# Patient Record
Sex: FEMALE | Race: WHITE | URBAN - METROPOLITAN AREA
[De-identification: names, ages, dates, MRNs, and addresses within clinical notes are randomized per-mention and may not be internally consistent; named-entity substitution may affect disease eponyms.]

---

## 2020-11-19 LAB — PAP SMEAR: NORMAL

## 2021-07-19 ENCOUNTER — HOSPITAL ENCOUNTER (OUTPATIENT)
Dept: GENERAL RADIOLOGY | Age: 46
Discharge: HOME | End: 2021-07-19
Payer: MEDICARE

## 2021-07-19 ENCOUNTER — HOSPITAL ENCOUNTER (OUTPATIENT)
Age: 46
Discharge: HOME | End: 2021-07-19
Payer: MEDICARE

## 2021-07-19 ENCOUNTER — OFFICE VISIT (OUTPATIENT)
Dept: FAMILY MEDICINE CLINIC | Age: 46
End: 2021-07-19
Payer: MEDICARE

## 2021-07-19 ENCOUNTER — TELEPHONE (OUTPATIENT)
Dept: FAMILY MEDICINE CLINIC | Age: 46
End: 2021-07-19

## 2021-07-19 VITALS
OXYGEN SATURATION: 98 % | DIASTOLIC BLOOD PRESSURE: 82 MMHG | SYSTOLIC BLOOD PRESSURE: 120 MMHG | HEART RATE: 102 BPM | TEMPERATURE: 97.6 F | BODY MASS INDEX: 18.51 KG/M2 | WEIGHT: 115.2 LBS | HEIGHT: 66 IN

## 2021-07-19 DIAGNOSIS — M25.50 POLYARTHRALGIA: ICD-10-CM

## 2021-07-19 DIAGNOSIS — F33.1 MODERATE EPISODE OF RECURRENT MAJOR DEPRESSIVE DISORDER (HCC): Chronic | ICD-10-CM

## 2021-07-19 DIAGNOSIS — F41.1 GENERALIZED ANXIETY DISORDER: Primary | ICD-10-CM

## 2021-07-19 PROCEDURE — 1036F TOBACCO NON-USER: CPT | Performed by: FAMILY MEDICINE

## 2021-07-19 PROCEDURE — 99204 OFFICE O/P NEW MOD 45 MIN: CPT | Performed by: FAMILY MEDICINE

## 2021-07-19 PROCEDURE — G8420 CALC BMI NORM PARAMETERS: HCPCS | Performed by: FAMILY MEDICINE

## 2021-07-19 PROCEDURE — 73502 X-RAY EXAM HIP UNI 2-3 VIEWS: CPT

## 2021-07-19 PROCEDURE — G8427 DOCREV CUR MEDS BY ELIG CLIN: HCPCS | Performed by: FAMILY MEDICINE

## 2021-07-19 RX ORDER — ALPRAZOLAM 0.5 MG/1
0.5 TABLET ORAL NIGHTLY PRN
Qty: 30 TABLET | Refills: 0 | Status: SHIPPED | OUTPATIENT
Start: 2021-07-19 | End: 2021-08-18

## 2021-07-19 RX ORDER — ALPRAZOLAM 0.5 MG/1
0.5 TABLET ORAL NIGHTLY PRN
COMMUNITY
End: 2021-07-19 | Stop reason: SDUPTHER

## 2021-07-19 RX ORDER — TRAZODONE HYDROCHLORIDE 50 MG/1
50 TABLET ORAL NIGHTLY PRN
Qty: 90 TABLET | Refills: 1 | Status: SHIPPED | OUTPATIENT
Start: 2021-07-19 | End: 2022-01-26

## 2021-07-19 RX ORDER — TRAZODONE HYDROCHLORIDE 50 MG/1
50 TABLET ORAL NIGHTLY
COMMUNITY
Start: 2020-08-13 | End: 2021-07-19 | Stop reason: SDUPTHER

## 2021-07-19 RX ORDER — DULOXETIN HYDROCHLORIDE 30 MG/1
30 CAPSULE, DELAYED RELEASE ORAL DAILY
Qty: 30 CAPSULE | Refills: 1 | Status: SHIPPED | OUTPATIENT
Start: 2021-07-19 | End: 2021-09-22 | Stop reason: SDUPTHER

## 2021-07-19 SDOH — ECONOMIC STABILITY: FOOD INSECURITY: WITHIN THE PAST 12 MONTHS, YOU WORRIED THAT YOUR FOOD WOULD RUN OUT BEFORE YOU GOT MONEY TO BUY MORE.: NEVER TRUE

## 2021-07-19 SDOH — HEALTH STABILITY: PHYSICAL HEALTH: ON AVERAGE, HOW MANY MINUTES DO YOU ENGAGE IN EXERCISE AT THIS LEVEL?: 30 MIN

## 2021-07-19 SDOH — ECONOMIC STABILITY: FOOD INSECURITY: WITHIN THE PAST 12 MONTHS, THE FOOD YOU BOUGHT JUST DIDN'T LAST AND YOU DIDN'T HAVE MONEY TO GET MORE.: NEVER TRUE

## 2021-07-19 SDOH — ECONOMIC STABILITY: HOUSING INSECURITY: IN THE LAST 12 MONTHS, HOW MANY PLACES HAVE YOU LIVED?: 1

## 2021-07-19 SDOH — ECONOMIC STABILITY: INCOME INSECURITY: IN THE LAST 12 MONTHS, WAS THERE A TIME WHEN YOU WERE NOT ABLE TO PAY THE MORTGAGE OR RENT ON TIME?: NO

## 2021-07-19 SDOH — HEALTH STABILITY: PHYSICAL HEALTH: ON AVERAGE, HOW MANY DAYS PER WEEK DO YOU ENGAGE IN MODERATE TO STRENUOUS EXERCISE (LIKE A BRISK WALK)?: 4 DAYS

## 2021-07-19 SDOH — ECONOMIC STABILITY: TRANSPORTATION INSECURITY
IN THE PAST 12 MONTHS, HAS LACK OF TRANSPORTATION KEPT YOU FROM MEETINGS, WORK, OR FROM GETTING THINGS NEEDED FOR DAILY LIVING?: NO

## 2021-07-19 SDOH — ECONOMIC STABILITY: TRANSPORTATION INSECURITY
IN THE PAST 12 MONTHS, HAS THE LACK OF TRANSPORTATION KEPT YOU FROM MEDICAL APPOINTMENTS OR FROM GETTING MEDICATIONS?: NO

## 2021-07-19 SDOH — ECONOMIC STABILITY: HOUSING INSECURITY
IN THE LAST 12 MONTHS, WAS THERE A TIME WHEN YOU DID NOT HAVE A STEADY PLACE TO SLEEP OR SLEPT IN A SHELTER (INCLUDING NOW)?: NO

## 2021-07-19 ASSESSMENT — LIFESTYLE VARIABLES
HOW OFTEN DO YOU HAVE A DRINK CONTAINING ALCOHOL: 2-4 TIMES A MONTH
HOW MANY STANDARD DRINKS CONTAINING ALCOHOL DO YOU HAVE ON A TYPICAL DAY: 3 OR 4

## 2021-07-19 ASSESSMENT — SOCIAL DETERMINANTS OF HEALTH (SDOH)
HOW HARD IS IT FOR YOU TO PAY FOR THE VERY BASICS LIKE FOOD, HOUSING, MEDICAL CARE, AND HEATING?: NOT HARD AT ALL
HOW OFTEN DO YOU GET TOGETHER WITH FRIENDS OR RELATIVES?: TWICE A WEEK
IN A TYPICAL WEEK, HOW MANY TIMES DO YOU TALK ON THE PHONE WITH FAMILY, FRIENDS, OR NEIGHBORS?: MORE THAN THREE TIMES A WEEK
HOW OFTEN DO YOU ATTENT MEETINGS OF THE CLUB OR ORGANIZATION YOU BELONG TO?: NEVER
DO YOU BELONG TO ANY CLUBS OR ORGANIZATIONS SUCH AS CHURCH GROUPS UNIONS, FRATERNAL OR ATHLETIC GROUPS, OR SCHOOL GROUPS?: NO
HOW OFTEN DO YOU ATTEND CHURCH OR RELIGIOUS SERVICES?: NEVER

## 2021-07-19 ASSESSMENT — ENCOUNTER SYMPTOMS
SORE THROAT: 0
RHINORRHEA: 0
CONSTIPATION: 0
ABDOMINAL PAIN: 0
DIARRHEA: 0
CHEST TIGHTNESS: 0
SHORTNESS OF BREATH: 0

## 2021-07-19 ASSESSMENT — PATIENT HEALTH QUESTIONNAIRE - PHQ9
6. FEELING BAD ABOUT YOURSELF - OR THAT YOU ARE A FAILURE OR HAVE LET YOURSELF OR YOUR FAMILY DOWN: 1
2. FEELING DOWN, DEPRESSED OR HOPELESS: 2
5. POOR APPETITE OR OVEREATING: 0
SUM OF ALL RESPONSES TO PHQ9 QUESTIONS 1 & 2: 4
1. LITTLE INTEREST OR PLEASURE IN DOING THINGS: 2
9. THOUGHTS THAT YOU WOULD BE BETTER OFF DEAD, OR OF HURTING YOURSELF: 0
8. MOVING OR SPEAKING SO SLOWLY THAT OTHER PEOPLE COULD HAVE NOTICED. OR THE OPPOSITE, BEING SO FIGETY OR RESTLESS THAT YOU HAVE BEEN MOVING AROUND A LOT MORE THAN USUAL: 0
10. IF YOU CHECKED OFF ANY PROBLEMS, HOW DIFFICULT HAVE THESE PROBLEMS MADE IT FOR YOU TO DO YOUR WORK, TAKE CARE OF THINGS AT HOME, OR GET ALONG WITH OTHER PEOPLE: 1
1. LITTLE INTEREST OR PLEASURE IN DOING THINGS: SEVERAL DAYS
3. TROUBLE FALLING OR STAYING ASLEEP: 2
2. FEELING DOWN, DEPRESSED OR HOPELESS: SEVERAL DAYS
SUM OF ALL RESPONSES TO PHQ QUESTIONS 1-9: 10
4. FEELING TIRED OR HAVING LITTLE ENERGY: 2
DEPRESSION UNABLE TO ASSESS: YES
SUM OF ALL RESPONSES TO PHQ9 QUESTIONS 1 & 2: 2
SUM OF ALL RESPONSES TO PHQ QUESTIONS 1-9: 10
7. TROUBLE CONCENTRATING ON THINGS, SUCH AS READING THE NEWSPAPER OR WATCHING TELEVISION: 1
SUM OF ALL RESPONSES TO PHQ QUESTIONS 1-9: 10

## 2021-07-19 ASSESSMENT — COLUMBIA-SUICIDE SEVERITY RATING SCALE - C-SSRS
1. WITHIN THE PAST MONTH, HAVE YOU WISHED YOU WERE DEAD OR WISHED YOU COULD GO TO SLEEP AND NOT WAKE UP?: NO
6. HAVE YOU EVER DONE ANYTHING, STARTED TO DO ANYTHING, OR PREPARED TO DO ANYTHING TO END YOUR LIFE?: NO
1. WITHIN THE PAST MONTH, HAVE YOU WISHED YOU WERE DEAD OR WISHED YOU COULD GO TO SLEEP AND NOT WAKE UP?: YES
6. HAVE YOU EVER DONE ANYTHING, STARTED TO DO ANYTHING, OR PREPARED TO DO ANYTHING TO END YOUR LIFE?: NO
2. HAVE YOU ACTUALLY HAD ANY THOUGHTS OF KILLING YOURSELF?: NO
2. HAVE YOU ACTUALLY HAD ANY THOUGHTS OF KILLING YOURSELF?: NO

## 2021-07-19 NOTE — PROGRESS NOTES
Subjective:   Patient ID: Andreina Ayala is a 40 y.o. female here today to establish care. HPI by clinical support staff:   Chief Complaint   Patient presents with   Karissa López     PHQ-9 Total Score: 10 (7/19/2021  9:07 AM)  Thoughts that you would be better off dead, or of hurting yourself in some way: 0 (7/19/2021  9:07 AM)     Preliminary data above this line collected by clinical support staff.    ______________________________________________________________________  HPI by Provider:   HPI   Patient presents to establish care. History reviewed and updated with patient today. Reports being diagnosed with anxiety about 7 years ago while she was going through her separation and divorce from her . States that during those times was mainly dependent on alcohol for symptom relief however was established with a provider in [facility] and has tried sertraline, Prozac of which did not help with her symptoms. She was started on alprazolam as needed and trazodone for sleep at night which has been helpful. States that she has had 60 tablets of alprazolam in the last 12 months tries to hold off on use unless very necessary. Has had Gene[employer]ight testing completed. He is currently not seeing a psychologist but is open to doing so, denies any suicidal homicidal ideations. Her anxiety symptoms are primarily worried, palpitations and difficulty sleeping. 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 States that her symptoms can be significant enough that it affects her work. Has noticed fatigue and right hip limited mobility. Has not tried anything for her symptoms so far. Data above this line collected by Provider. Patient's medications, allergies, past medical, surgical, social and family histories were reviewed and updated as appropriate.   Patient Care Team:  Ignacio Jose MD as PCP - General (Family Medicine)    No Known Allergies  Current Outpatient Medications on File Prior to Visit   Medication Sig Dispense Refill    Levonorgestrel (MIRENA, 52 MG, IU) by Intrauterine route       No current facility-administered medications on file prior to visit. Review of Systems   Constitutional: Negative for activity change, appetite change, fatigue and fever. HENT: Negative for congestion, rhinorrhea and sore throat. Respiratory: Negative for chest tightness and shortness of breath. Cardiovascular: Positive for palpitations. Negative for chest pain and leg swelling. Gastrointestinal: Negative for abdominal pain, constipation and diarrhea. Genitourinary: Negative for dysuria and frequency. Musculoskeletal: Positive for arthralgias. Negative for gait problem. Neurological: Negative for dizziness, weakness and headaches. Psychiatric/Behavioral: Positive for sleep disturbance. Negative for decreased concentration, dysphoric mood, hallucinations, self-injury and suicidal ideas. The patient is nervous/anxious. All other systems reviewed and are negative. ROS above this line reviewed by Provider. Objective:   /82 (Site: Left Upper Arm, Position: Sitting, Cuff Size: Small Adult)   Pulse 102   Temp 97.6 °F (36.4 °C) (Oral)   Ht 5' 6\" (1.676 m)   Wt 115 lb 3.2 oz (52.3 kg)   LMP 07/19/2021 (Exact Date)   SpO2 98%   BMI 18.59 kg/m²   Physical Exam  Vitals and nursing note reviewed. Constitutional:       General: She is not in acute distress. Appearance: Normal appearance. She is normal weight. She is not ill-appearing, toxic-appearing or diaphoretic. HENT:      Head: Normocephalic and atraumatic. Eyes:      General: No scleral icterus. Conjunctiva/sclera: Conjunctivae normal.   Cardiovascular:      Rate and Rhythm: Normal rate and regular rhythm. Heart sounds: Normal heart sounds. No murmur heard. No friction rub. No gallop.     Pulmonary:      Effort: Pulmonary effort is normal. No respiratory distress. Breath sounds: Normal breath sounds. No stridor. No wheezing, rhonchi or rales. Musculoskeletal:      Cervical back: Normal range of motion. Skin:     General: Skin is warm and dry. Neurological:      Mental Status: She is alert. Psychiatric:         Mood and Affect: Mood normal.         Behavior: Behavior normal.       I reviewed the labs, imaging and external notes from care everywhere in my medical decision making. No results found for: WBC, HGB, HCT, MCV, PLT  No results found for: NA, K, BUN, CREATININE, GLUCOSE, CALCIUM, BILITOT, ALKPHOS, AST, ALT, GFRAA  No results found for: TSHFT4, TSH, FT3  No results found for: LABA1C  No results found for: EAG  No results found for: CHOL, TRIG, HDL, LDLCHOLESTEROL, CHOLHDLRATIO  No results found for: LABMICR, GPCT68BLR  No results found for: VITD25  Assessment and Plan:   1. Generalized anxiety disorder  Alprazolam prn, start Cymbalta. Will establish with psychologist- list of [facility] providers given to her. - CBC Auto Differential; Future  - TSH without Reflex; Future  - T4, FREE; Future  - ALPRAZolam (XANAX) 0.5 MG tablet; Take 1 tablet by mouth nightly as needed for Anxiety for up to 30 days. Dispense: 30 tablet; Refill: 0    2. Moderate episode of recurrent major depressive disorder (HCC)  Trial Cymbalta. 3. Polyarthralgia  Rule out  Autoimmune causes- referral to rheumatology if persists. Trial Cymbalta. - Sedimentation Rate; Future  - C-Reactive Protein; Future  - Rheumatoid Factor; Future  - SHIELA profile; Future  - HLA-B27 Antigen; Future  - CBC Auto Differential; Future  - TSH without Reflex; Future  - T4, FREE; Future         This chart note was prepared using a voice recognition dictation program. This note was reviewed for accuracy; however, addition, deletion and sound-alike word errors may occur.  If there are any questions regarding this chart note, please contact the originating

## 2021-07-26 DIAGNOSIS — F41.1 GENERALIZED ANXIETY DISORDER: ICD-10-CM

## 2021-07-26 DIAGNOSIS — M25.50 POLYARTHRALGIA: ICD-10-CM

## 2021-07-26 LAB
BASOPHILS ABSOLUTE: 0 K/UL (ref 0–0.2)
BASOPHILS RELATIVE PERCENT: 0.7 %
C-REACTIVE PROTEIN: <3 MG/L (ref 0–5.1)
EOSINOPHILS ABSOLUTE: 0.1 K/UL (ref 0–0.6)
EOSINOPHILS RELATIVE PERCENT: 1.9 %
HCT VFR BLD CALC: 43 % (ref 36–48)
HEMOGLOBIN: 14.9 G/DL (ref 12–16)
LYMPHOCYTES ABSOLUTE: 1 K/UL (ref 1–5.1)
LYMPHOCYTES RELATIVE PERCENT: 19.7 %
MCH RBC QN AUTO: 32.3 PG (ref 26–34)
MCHC RBC AUTO-ENTMCNC: 34.6 G/DL (ref 31–36)
MCV RBC AUTO: 93.3 FL (ref 80–100)
MONOCYTES ABSOLUTE: 0.5 K/UL (ref 0–1.3)
MONOCYTES RELATIVE PERCENT: 9.9 %
NEUTROPHILS ABSOLUTE: 3.4 K/UL (ref 1.7–7.7)
NEUTROPHILS RELATIVE PERCENT: 67.8 %
PDW BLD-RTO: 12.6 % (ref 12.4–15.4)
PLATELET # BLD: 232 K/UL (ref 135–450)
PMV BLD AUTO: 9.7 FL (ref 5–10.5)
RBC # BLD: 4.6 M/UL (ref 4–5.2)
RHEUMATOID FACTOR: <10 IU/ML
SEDIMENTATION RATE, ERYTHROCYTE: 4 MM/HR (ref 0–20)
T4 FREE: 1.1 NG/DL (ref 0.9–1.8)
TSH SERPL DL<=0.05 MIU/L-ACNC: 1.19 UIU/ML (ref 0.27–4.2)
WBC # BLD: 5 K/UL (ref 4–11)

## 2021-07-28 LAB — HLA B27: NEGATIVE

## 2021-08-02 ENCOUNTER — VIRTUAL VISIT (OUTPATIENT)
Dept: FAMILY MEDICINE CLINIC | Age: 46
End: 2021-08-02
Payer: MEDICARE

## 2021-08-02 DIAGNOSIS — F41.1 GENERALIZED ANXIETY DISORDER: Primary | ICD-10-CM

## 2021-08-02 DIAGNOSIS — F33.1 MODERATE EPISODE OF RECURRENT MAJOR DEPRESSIVE DISORDER (HCC): ICD-10-CM

## 2021-08-02 PROCEDURE — 99214 OFFICE O/P EST MOD 30 MIN: CPT | Performed by: FAMILY MEDICINE

## 2021-08-02 PROCEDURE — G8420 CALC BMI NORM PARAMETERS: HCPCS | Performed by: FAMILY MEDICINE

## 2021-08-02 PROCEDURE — 1036F TOBACCO NON-USER: CPT | Performed by: FAMILY MEDICINE

## 2021-08-02 PROCEDURE — G8427 DOCREV CUR MEDS BY ELIG CLIN: HCPCS | Performed by: FAMILY MEDICINE

## 2021-08-02 RX ORDER — LEVOMEFOLATE CALCIUM 15 MG
1 TABLET ORAL DAILY
Qty: 30 TABLET | Refills: 1 | Status: SHIPPED | OUTPATIENT
Start: 2021-08-02 | End: 2022-01-26

## 2021-08-02 ASSESSMENT — ENCOUNTER SYMPTOMS
DIARRHEA: 0
RHINORRHEA: 0
SHORTNESS OF BREATH: 0
ABDOMINAL PAIN: 0
CHEST TIGHTNESS: 0
SORE THROAT: 0
CONSTIPATION: 0

## 2021-08-02 NOTE — PROGRESS NOTES
Subjective:   Patient ID: Linette Quinn is a 40 y.o. female. HPI by clinical support staff:   Chief Complaint   Patient presents with    Anxiety     medication check       Preliminary data above this line collected by clinical support staff.    ______________________________________________________________________  HPI by Provider:   HPI   Patient reports Cymbalta was causing side effects - nausea, insomnia dry mouth so tried switching time of day for use. Will like to give it another week to see how it goes. Only taking trazodone prn. Used xanax prn for sleep. Also takes Ashwaganda, magnessium and other  herbal supplements for nervous system health. Stopped taking 5ht  Wants to discuss utility of Deplin. Data above this line collected by Provider. Patient's medications, allergies, past medical, surgical, social and family histories were reviewed and updated as appropriate. Patient Care Team:  Dolores Mistry MD as PCP - General (Family Medicine)  Dolores Mistry MD as PCP - [facility] Empaneled Provider    Current Outpatient Medications on File Prior to Visit   Medication Sig Dispense Refill    Levonorgestrel (MIRENA, 52 MG, IU) by Intrauterine route      ALPRAZolam (XANAX) 0.5 MG tablet Take 1 tablet by mouth nightly as needed for Anxiety for up to 30 days. 30 tablet 0    traZODone (DESYREL) 50 MG tablet Take 1 tablet by mouth nightly as needed for Sleep 90 tablet 1    DULoxetine (CYMBALTA) 30 MG extended release capsule Take 1 capsule by mouth daily 30 capsule 1     No current facility-administered medications on file prior to visit. Review of Systems   Constitutional: Negative for activity change, appetite change, fatigue and fever. HENT: Negative for congestion, rhinorrhea and sore throat. Respiratory: Negative for chest tightness and shortness of breath. Cardiovascular: Negative for chest pain, palpitations and leg swelling.    Gastrointestinal: Negative for abdominal pain, constipation and diarrhea. Genitourinary: Negative for dysuria and frequency. Musculoskeletal: Negative for arthralgias. Neurological: Negative for dizziness, weakness and headaches. Psychiatric/Behavioral: Positive for sleep disturbance. Negative for decreased concentration, dysphoric mood, hallucinations, self-injury and suicidal ideas. The patient is nervous/anxious. The patient is not hyperactive. All other systems reviewed and are negative. ROS above this line reviewed by Provider. Objective:   LMP 07/19/2021 (Exact Date)   Physical Exam  Nursing note reviewed. Constitutional:       General: She is not in acute distress. Appearance: Normal appearance. She is normal weight. She is not ill-appearing, toxic-appearing or diaphoretic. Comments: Well groomed   HENT:      Head: Normocephalic and atraumatic. Pulmonary:      Effort: Pulmonary effort is normal.      Comments: Speaking in full sentences  Musculoskeletal:      Cervical back: Normal range of motion. Neurological:      Mental Status: She is alert. Psychiatric:         Mood and Affect: Mood normal.         Behavior: Behavior normal.         Thought Content: Thought content normal.       Assessment and Plan:   1. Generalized anxiety disorder  Continue Cymbalta another week will reach out if any questions. Has only tried sertraline and Prozac in the past.  - L-Methylfolate 15 MG TABS; Take 1 tablet by mouth daily  Dispense: 30 tablet; Refill: 1    2. Moderate episode of recurrent major depressive disorder ([address]  Continue Cymbalta another week will reach out if any questions. Has only tried sertraline and Prozac in the past.  - L-Methylfolate 15 MG TABS; Take 1 tablet by mouth daily  Dispense: 30 tablet; Refill: 1     German Davila  is a 40 y.o. female being evaluated by a Virtual Visit (video visit) encounter to address concerns as mentioned above. A caregiver was present when appropriate.  Due to this being a TeleHealth encounter (During MSIPA-13 public health emergency), evaluation of the following organ systems was limited: Vitals/Constitutional/EENT/Resp/CV/GI//MS/Neuro/Skin/Heme-Lymph-Imm. Pursuant to the emergency declaration under the [facility], [address] [place] [address] authority and the [employer] Act, this Virtual Visit was conducted with patient's (and/or legal guardian's) consent, to reduce the patient's risk of exposure to COVID-19 and provide necessary medical care. The patient (and/or legal guardian) has also been advised to contact this office for worsening conditions or problems, and seek emergency medical treatment and/or call 911 if deemed necessary. Patient identification was verified at the start of the visit: Yes    Total time spent for this encounter: Not billed by time    Services were provided through a video synchronous discussion virtually to substitute for in-person clinic visit. Patient  located at their individual homes. This chart note was prepared using a voice recognition dictation program. This note was reviewed for accuracy; however, addition, deletion and sound-alike word errors may occur. If there are any questions regarding this chart note, please contact the originating provider. Electronically signed by   Valentín East MD  8/2/2021   10:15 AM    Return in about 2 weeks (around 8/16/2021) for Anxiety.

## 2021-08-16 ENCOUNTER — PATIENT MESSAGE (OUTPATIENT)
Dept: FAMILY MEDICINE CLINIC | Age: 46
End: 2021-08-16

## 2021-08-16 NOTE — TELEPHONE ENCOUNTER
From: Abraham Cameron  To: Camron Lockett MD  Sent: 8/16/2021 9:12 AM EDT  Subject: Visit Follow-Up Question    Hi, I am following up with Dr. Dg Mariee regarding my medication. I am doing great, feeling so good. I am keeping up with the medication as well as all my other practices, yoga, breathwork, meditation, clean eating, etc. I am still sleeping about 5 hours a night, but feel generally energized throughout the day. I think I may try to incorporate more cardiovascular exercise and see if burning off some of this energy will help me sleep better. I have tried the sleeping medication but it doesn't help. I'm ok with that for now. If it becomes more of a problem, we can address it. You wanted to know if I felt any relief from the joint/tendon pain, the answer is no. I am investigating other avenues, such as acupuncture, to address it.

## 2021-08-19 ENCOUNTER — PATIENT MESSAGE (OUTPATIENT)
Dept: FAMILY MEDICINE CLINIC | Age: 46
End: 2021-08-19

## 2021-08-19 DIAGNOSIS — M25.50 POLYARTHRALGIA: Primary | ICD-10-CM

## 2021-08-19 NOTE — TELEPHONE ENCOUNTER
From: Vidya Curtis  To: Francisco Javier Gill MD  Sent: 8/19/2021 6:40 AM EDT  Subject: Non-Urgent Medical Question    About the acupuncture referral. one of the practitioners I am interested in working with does require a referral from a primary care physician. If that is the case and you do not know enough about it to refer me, how shall I progress forward? Would it be possible for you to look into it as a possible treatment plan for me? Here is a link to some evidence-based information. Tube[employer]co.za. org/awpzkjignhc-itktiztyzg-wrpqlyne/

## 2022-01-26 ENCOUNTER — VIRTUAL VISIT (OUTPATIENT)
Dept: PRIMARY CARE CLINIC | Age: 47
End: 2022-01-26
Payer: MEDICARE

## 2022-01-26 DIAGNOSIS — M54.41 CHRONIC BILATERAL LOW BACK PAIN WITH BILATERAL SCIATICA: Primary | ICD-10-CM

## 2022-01-26 DIAGNOSIS — G89.29 CHRONIC BILATERAL LOW BACK PAIN WITH BILATERAL SCIATICA: Primary | ICD-10-CM

## 2022-01-26 DIAGNOSIS — M35.4: ICD-10-CM

## 2022-01-26 DIAGNOSIS — M54.42 CHRONIC BILATERAL LOW BACK PAIN WITH BILATERAL SCIATICA: Primary | ICD-10-CM

## 2022-01-26 PROCEDURE — 99214 OFFICE O/P EST MOD 30 MIN: CPT | Performed by: FAMILY MEDICINE

## 2022-01-26 PROCEDURE — G8484 FLU IMMUNIZE NO ADMIN: HCPCS | Performed by: FAMILY MEDICINE

## 2022-01-26 PROCEDURE — 1036F TOBACCO NON-USER: CPT | Performed by: FAMILY MEDICINE

## 2022-01-26 PROCEDURE — G8427 DOCREV CUR MEDS BY ELIG CLIN: HCPCS | Performed by: FAMILY MEDICINE

## 2022-01-26 PROCEDURE — G8420 CALC BMI NORM PARAMETERS: HCPCS | Performed by: FAMILY MEDICINE

## 2022-01-26 NOTE — PROGRESS NOTES
Subjective:   Patient ID: Steve Shaw is a 40 y.o. female. HPI by clinical support staff:   Chief Complaint   Patient presents with    Joint Pain     Elle, is on the phone for vv for lower back pain with limited mobility and right side hip pain with burning , hamstring pain moving down to her knees with lots of burning and jolted kwong pains. Pain in her feet, hands and leg painwith searing pain on both of her heals. Preliminary data above this line collected by clinical support staff.    ______________________________________________________________________  HPI by Provider:   HPI   Patient presents for virtual visit today with complain of pain in multiple locations- lower back, hips, feet, hands, abdominal wall, hamstrings,knees, heels. Pain is causing limited mobility and describes it as a \"burning sensation with sharp jolts\"  Her \"ischial tuberosity is sore\", pain present everyday x 4 weeks. Trying to eat more clean- no increased stress or physical activity- works as a massage therapist  A month ago weaned off Cymbalta but doesn't think is related to symptom onset. Feels depression and anxiety are controlled. Tried stretching at home- doesn't like pills so doesn't take NSAIDs. Data above this line collected by Provider. Patient's medications, allergies, past medical, surgical, social and family histories were reviewed and updated as appropriate. Patient Care Team:  Cong Parker MD as PCP - General (Family Medicine)  Cong Parker MD as PCP - [facility] Empaneled Provider  Current Outpatient Medications on File Prior to Visit   Medication Sig Dispense Refill    Levonorgestrel (MIRENA, 52 MG, IU) by Intrauterine route       No current facility-administered medications on file prior to visit. Review of Systems   Constitutional: Negative for activity change, appetite change, fatigue and fever. HENT: Negative for congestion, rhinorrhea and sore throat.     Respiratory: Negative for chest tightness and shortness of breath. Cardiovascular: Negative for chest pain, palpitations and leg swelling. Gastrointestinal: Negative for abdominal pain, constipation and diarrhea. Genitourinary: Negative for dysuria and frequency. Musculoskeletal: Positive for arthralgias, back pain and myalgias. Neurological: Negative for dizziness, weakness and headaches. Psychiatric/Behavioral: Negative for hallucinations. All other systems reviewed and are negative. ROS above this line reviewed by Provider. Objective: There were no vitals taken for this visit. Physical Exam  Nursing note reviewed. Constitutional:       General: She is not in acute distress. Appearance: Normal appearance. She is normal weight. She is not ill-appearing, toxic-appearing or diaphoretic. Comments: Well groomed   HENT:      Head: Normocephalic and atraumatic. Pulmonary:      Effort: Pulmonary effort is normal.      Comments: Speaking in full sentences  Musculoskeletal:      Cervical back: Normal range of motion. Neurological:      Mental Status: She is alert. Psychiatric:         Mood and Affect: Mood normal.         Behavior: Behavior normal.         Thought Content: Thought content normal.       Assessment and Plan:   1. Chronic bilateral low back pain with bilateral sciatica  No alarm symptoms. Low back stretching exercises reviewed with the patient and will be performed twice daily. Moist heat locally. Back protective techniques reviewed,along with sleep positioning. Consider Physical Therapy and XRay studies if not improving. Call or return to clinic prn if these symptoms worsen or fail to improve as anticipated. - [address], DO, Physical Medicine and Rehabilitation, [place]    2.  Diffuse fasciitis  Referred to physiatry- advised on reducing foods that increase inflammation and trial NSAIDS- tumeric.  - [address] Deng Dunbar, DO, Physical Medicine and Rehabilitation, [facility]     Joe Cerna  is a 40 y.o. female being evaluated by a Virtual Visit (video visit) encounter to address concerns as mentioned above. A caregiver was present when appropriate. Due to this being a TeleHealth encounter (During ABPLW-94 public health emergency), evaluation of the following organ systems was limited: Vitals/Constitutional/EENT/Resp/CV/GI//MS/Neuro/Skin/Heme-Lymph-Imm. Pursuant to the emergency declaration under the [address] [place] [address], [address] [place] [address] authority and the [employer] Act, this Virtual Visit was conducted with patient's (and/or legal guardian's) consent, to reduce the patient's risk of exposure to COVID-19 and provide necessary medical care. The patient (and/or legal guardian) has also been advised to contact this office for worsening conditions or problems, and seek emergency medical treatment and/or call 911 if deemed necessary. Patient identification was verified at the start of the visit: Yes    Total time spent for this encounter: Not billed by time    Services were provided through a video synchronous discussion virtually to substitute for in-person clinic visit. Patient  located at their individual homes. This chart note was prepared using a voice recognition dictation program. This note was reviewed for accuracy; however, addition, deletion and sound-alike word errors may occur. If there are any questions regarding this chart note, please contact the originating provider. Electronically signed by   Lincoln Smith MD  1/26/2022   9:54 AM    Return in about 4 weeks (around 2/23/2022).

## 2022-01-27 ASSESSMENT — ENCOUNTER SYMPTOMS
SORE THROAT: 0
CONSTIPATION: 0
SHORTNESS OF BREATH: 0
BACK PAIN: 1
DIARRHEA: 0
CHEST TIGHTNESS: 0
ABDOMINAL PAIN: 0
RHINORRHEA: 0

## 2022-02-03 ENCOUNTER — PATIENT MESSAGE (OUTPATIENT)
Dept: PRIMARY CARE CLINIC | Age: 47
End: 2022-02-03

## 2022-02-03 NOTE — TELEPHONE ENCOUNTER
From: Mauricio Foster  To: Dr. Jillian Rivera: 2/3/2022 3:48 PM EST  Subject: Referral    Hello, can you refer me to a dermatologist within the group who accepts Medicaid/[place] [employer]? Thank you!

## 2022-02-04 ENCOUNTER — HOSPITAL ENCOUNTER (OUTPATIENT)
Dept: PHYSICAL THERAPY | Age: 47
Setting detail: THERAPIES SERIES
Discharge: HOME | End: 2022-02-04
Payer: MEDICARE

## 2022-02-10 ENCOUNTER — HOSPITAL ENCOUNTER (OUTPATIENT)
Dept: PHYSICAL THERAPY | Age: 47
Setting detail: THERAPIES SERIES
Discharge: HOME | End: 2022-02-10
Payer: MEDICARE

## 2022-02-10 ENCOUNTER — OFFICE VISIT (OUTPATIENT)
Dept: ORTHOPEDIC SURGERY | Age: 47
End: 2022-02-10
Payer: MEDICARE

## 2022-02-10 VITALS — HEIGHT: 65 IN | BODY MASS INDEX: 18.33 KG/M2 | WEIGHT: 110 LBS

## 2022-02-10 DIAGNOSIS — S73.191A TEAR OF RIGHT ACETABULAR LABRUM, INITIAL ENCOUNTER: ICD-10-CM

## 2022-02-10 DIAGNOSIS — M25.551 BILATERAL HIP PAIN: ICD-10-CM

## 2022-02-10 DIAGNOSIS — M25.552 BILATERAL HIP PAIN: ICD-10-CM

## 2022-02-10 DIAGNOSIS — M25.851 FEMOROACETABULAR IMPINGEMENT OF RIGHT HIP: Primary | ICD-10-CM

## 2022-02-10 PROCEDURE — 97110 THERAPEUTIC EXERCISES: CPT | Performed by: PHYSICAL THERAPIST

## 2022-02-10 PROCEDURE — G8484 FLU IMMUNIZE NO ADMIN: HCPCS | Performed by: ORTHOPAEDIC SURGERY

## 2022-02-10 PROCEDURE — 99204 OFFICE O/P NEW MOD 45 MIN: CPT | Performed by: ORTHOPAEDIC SURGERY

## 2022-02-10 PROCEDURE — 97530 THERAPEUTIC ACTIVITIES: CPT | Performed by: PHYSICAL THERAPIST

## 2022-02-10 PROCEDURE — 1036F TOBACCO NON-USER: CPT | Performed by: ORTHOPAEDIC SURGERY

## 2022-02-10 PROCEDURE — 97161 PT EVAL LOW COMPLEX 20 MIN: CPT | Performed by: PHYSICAL THERAPIST

## 2022-02-10 PROCEDURE — G8427 DOCREV CUR MEDS BY ELIG CLIN: HCPCS | Performed by: ORTHOPAEDIC SURGERY

## 2022-02-10 PROCEDURE — G8419 CALC BMI OUT NRM PARAM NOF/U: HCPCS | Performed by: ORTHOPAEDIC SURGERY

## 2022-02-10 NOTE — PLAN OF CARE
she reports burning in the HS. Patient was referred to PT by her PCP. She saw Dr. Taylor Sweeney today and has MRI ordered for R hip.      Fear avoidance: I should not do physical activities that (might) make my pain worse   [] True   [x] False     Relevant Medical History: Anxiety  Functional Outcome: Oswestry: raw score = 12; dysfunction = 24%    Pain Scale: 4/10  Easing factors: rest  Provocative factors: fwd bending, twisting in WB, after prolonged sitting going to stand    Type: [x]Constant   []Intermittent  []Radiating []Localized []other:     Numbness/Tingling: burning in HS intermittent    Occupation/School: LMT, ,     Living Status/Prior Level of Function: Prior to this injury / incident, pt was independent with ADLs and IADLs, work and exercise without pain, home ADLs, walking    OBJECTIVE:     Palpation: tender at PSIS and SI border     Functional Mobility/Transfers: sit-stand transition with pain, lx ext is limited  Posture: no lx lordosis    Inspection: skin WNL, mm guarding in glut, Lx P-S and HS     Gait: (include devices/WB status) equal step length, good heel to toe pattern, no AD    Bandages/Dressings/Incisions: NA    Dermatomes Normal Abnormal Comments   inguinal area (L1)  x     anterior mid-thigh (L2) x     distal ant thigh/med knee (L3) x     medial lower leg and foot (L4) x     lateral lower leg and foot (L5) x     posterior calf (S1) x     medial calcaneus (S2) x         Reflexes Normal Abnormal Comments   S1-2 Seated achilles x     S1-2 Prone knee bend x     L3-4 Patellar tendon      Clonus      Babinski          ROM  Comments   Lumbar Flex Min restirctions pn coming out of flexion   Lumbar Ext Mod restrictions      ROM LEFT RIGHT Comments   Lumbar Side Bend   symmetrical   Lumbar Rotation   symmetrical   Hip Flexion wnl wnl    Hip Abd      Hip ER 25 25    Hip IR 25 20    Hip Extension      Knee Ext      Knee Flex      Hamstring Flex -40 -50    Piriformis Mod+ Mod-severe CALF MOD MOD              Joint mobility: hip WNL, Lx-spine and SI = hypo  R>L      Strength / Myotomes LEFT RIGHT Comments   Multifidus Fair+ Fair+    Transverse Ab good good    Hip Flexors (L1-2) 5 5    Hip Abductors      Hip Extensors fair fair    Hip Internal Rotators      Hip External Rotators fair fair    Quads (L2-4) 5 5    Hamstrings  5 5    Ankle Plantarflexion (S1-2)      Ankle Dorsiflexion (L4-5) 5 5    Ankle Inversion      Ankle Eversion (S1-2)      Great Toe Extension (L5)          Neural dynamic tension testing Normal Abnormal Comments   Slump Test  - Degree of knee flexion:       SLR       0-30 x     30-70 x     Femoral nerve (L2-4) x         Orthopedic Special Tests:    Normal Abnormal N/A Comments   Toe walk   x      Heel Walk x      Fwd Bend-aberrant or innominate mvmt)  L     Trendelenburg  R     Kemps/Quadrant       Genesis BILL/Charlie FRENCH  For tightness    Hip scour x      SLR x      Crossed SLR x      Supine to sit       Hip thrust X      SI distraction/compression       PA/Spring  R     Prone Instability test       Prone knee bend X      Sacral Spring/thrust           Lx and SI are locked up         [x] Patient history, allergies, meds reviewed. Medical chart reviewed. See intake form. Review Of Systems (ROS):  [x]Performed Review of systems (Integumentary, CardioPulmonary, Neurological) by intake and observation. Intake form has been scanned into medical record. Patient has been instructed to contact their primary care physician regarding ROS issues if not already being addressed at this time.       Co-morbidities/Complexities (which will affect course of rehabilitation):   []None           Arthritic conditions   []Rheumatoid arthritis (M05.9)  []Osteoarthritis (M19.91)   Cardiovascular conditions   []Hypertension (I10)  []Hyperlipidemia (E78.5)  []Angina pectoris (I20)  []Atherosclerosis (I70)   Musculoskeletal conditions   []Disc pathology   []Congenital spine pathologies []Prior surgical intervention  []Osteoporosis (M81.8)  []Osteopenia (M85.8)   Endocrine conditions   []Hypothyroid (E03.9)  []Hyperthyroid Gastrointestinal conditions   []Constipation (L64.01)   Metabolic conditions   []Morbid obesity (E66.01)  []Diabetes type 1(E10.65) or 2 (E11.65)   []Neuropathy (G60.9)     Pulmonary conditions   []Asthma (J45)  []Coughing   []COPD (J44.9)   Psychological Disorders  [x]Anxiety (F41.9)  []Depression (F32.9)   []Other:   []Other:           Barriers to/and or personal factors that will affect rehab potential:              []Age  []Sex    []Smoker              []Motivation/Lack of Motivation                        [x]Co-Morbidities              []Cognitive Function, education/learning barriers              [x]Environmental, home barriers              [x]profession/work barriers  []past PT/medical experience  []other:  Justification:    Falls Risk Assessment (30 days):   [x] Falls Risk assessed and no intervention required.   [] Falls Risk assessed and Patient requires intervention due to being higher risk   TUG score (>12s at risk):     [] Falls education provided, including         ASSESSMENT:  Functional Impairments:     [x]Noted lumbar/proximal hip hypomobility   []Noted lumbosacral and/or generalized hypermobility   [x]Decreased Lumbosacral/hip/LE functional ROM   [x]Decreased core/proximal hip strength and neuromuscular control    []Decreased LE functional strength    []Abnormal reflexes/sensation/myotomal/dermatomal deficits  []Reduced balance/proprioceptive control    []other:      Functional Activity Limitations (from functional questionnaire and intake)   []Reduced ability to tolerate prolonged functional positions   [x]Reduced ability or difficulty with changes of positions or transfers between positions   [x]Reduced ability to maintain good posture and demonstrate good body mechanics with sitting, bending, and lifting   []Reduced ability to sleep   [x] Reduced ability or tolerance with driving and/or computer work   [x]Reduced ability to perform lifting, reaching, carrying tasks   []Reduced ability to squat   [x]Reduced ability to forward bend   []Reduced ability to ambulate prolonged functional periods/distances/surfaces   []Reduced ability to ascend/descend stairs   []other:       Participation Restrictions   []Reduced participation in self care activities   [x]Reduced participation in home management activities   [x]Reduced participation in work activities   [x]Reduced participation in social activities. [x]Reduced participation in sport/recreational activities. Classification:   []Signs/symptoms consistent with Lumbar instability/stabilization subgroup. [x]Signs/symptoms consistent with Lumbar mobilization/manipulation subgroup, myotomes and dermatomes intact. Meets manipulation criteria. []Signs/symptoms consistent with Lumbar direction specific/centralization subgroup   []Signs/symptoms consistent with Lumbar traction subgroup     []Signs/symptoms consistent with lumbar facet dysfunction   []Signs/symptoms consistent with lumbar stenosis type dysfunction   []Signs/symptoms consistent with nerve root involvement including myotome & dermatome dysfunction   []Signs/symptoms consistent with post-surgical status including: decreased ROM, strength and function.    []signs/symptoms consistent with pathology which may benefit from Dry needling     []other:      Prognosis/Rehab Potential:      []Excellent   [x]Good    []Fair   []Poor    Tolerance of evaluation/treatment:    []Excellent   [x]Good    []Fair   []Poor     Physical Therapy Evaluation Complexity Justification  [x] A history of present problem with:  [] no personal factors and/or comorbidities that impact the plan of care;  [x]1-2 personal factors and/or comorbidities that impact the plan of care  []3 personal factors and/or comorbidities that impact the plan of care  [x] An examination of body systems using standardized tests and measures addressing any of the following: body structures and functions (impairments), activity limitations, and/or participation restrictions;:  [x] a total of 1-2 or more elements   [] a total of 3 or more elements   [] a total of 4 or more elements   [x] A clinical presentation with:  [x] stable and/or uncomplicated characteristics   [] evolving clinical presentation with changing characteristics  [] unstable and unpredictable characteristics;   [x] Clinical decision making of [x] low, [] moderate, [] high complexity using standardized patient assessment instrument and/or measurable assessment of functional outcome. [x] EVAL (LOW) 04742 (typically 15 minutes face-to-face)  [] EVAL (MOD) 23566 (typically 30 minutes face-to-face)  [] EVAL (HIGH) 67629 (typically 45 minutes face-to-face)  [] RE-EVAL     PLAN: Begin PT focusing on: proximal hip mobilizations, LB mobs, LB core activation, proximal hip activation, and HEP    Frequency/Duration: 2days per week for 4-6 Weeks:  Interventions:  [x]  Therapeutic exercise including: strength training, ROM, for LE, Glutes and core   [x]  NMR activation and proprioception for glutes , LE and Core   [x]  Manual therapy as indicated for Hip complex, LE and spine to include: Dry Needling/IASTM, STM, PROM, Gr I-IV mobilizations, manipulation. [x]  Modalities as needed that may include: thermal agents, E-stim, Biofeedback, US, iontophoresis as indicated  [x]  Patient education on joint protection, postural re-education, activity modification, progression of HEP. HEP instruction: Written HEP instructions provided and reviewed     GOALS:  Patient stated goal:  Reduce pain, create mm balance, restore ROM  [] Progressing: [] Met: [] Not Met: [] Adjusted    Therapist goals for Patient:   Short Term Goals: To be achieved in: 2 weeks  1. Independent in HEP and progression per patient tolerance, in order to prevent re-injury.    [] Progressing: [] Met: [] Not Met: [] Adjusted  2. Patient will have a decrease in pain to facilitate improvement in movement, function, and ADLs as indicated by Functional Deficits. [] Progressing: [] Met: [] Not Met: [] Adjusted    Long Term Goals: To be achieved in:4-6 weeks  1. Disability index score of 18% or less for the YOANNA to assist with reaching prior level of function. [] Progressing: [] Met: [] Not Met: [] Adjusted  2. Patient will demonstrate increased AROM to WNL, good LS mobility, good hip ROM to allow for proper joint functioning as indicated by patients Functional Deficits. [] Progressing: [] Met: [] Not Met: [] Adjusted  3. Patient will demonstrate an increase in Strength to good proximal hip and core activation to allow for proper functional mobility with yoga poses, and sit to stand transfers. [] Progressing: [] Met: [] Not Met: [] Adjusted  4. 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 [] Progressing: [] Met: [] Not Met: [] Adjusted  5.  Patient will report 75% improvement in pain and function   [] Progressing: [] Met: [] Not Met: [] Adjusted     Electronically signed by:  Chel Nance PTMPT [id number]

## 2022-02-10 NOTE — FLOWSHEET NOTE
[facility]  Phone: [phone]  Fax: [phone]    Physical Therapy Treatment Note/ Progress Report:     Date:  2/10/2022    Patient Name:  Burke Powell    :  1975  MRN: [id number]  Restrictions/Precautions:    Medical/Treatment Diagnosis Information:  Diagnosis: M54.42, M54.41, G89.29,   LBP  M35.4 diffuse fasciitis  Treatment Diagnosis: LBP with Lx-spine restrictions, poor flexibility and core weakness  Insurance/Certification information:  PT Insurance Information: [employer]  Physician Information:  Referring Practitioner: Tyson Valdovinos MD  Plan of care signed (Y/N): []  Yes  [x]  No     Date of Patient follow up with Physician:      Progress Report: []  Yes  [x]  No     Date Range for reporting period:  Beginnin/10/22  Ending:     Progress report due (10 Rx/or 30 days whichever is less):     Recertification due (POC duration/ or 90 days whichever is less):     Visit # Insurance Allowable Auth required? Date Range   1 30 []  Yes  []  No PCY     Latex Allergy:  [x]NO      []YES  Preferred Language for Healthcare:   [x]English       []other:    Functional Scale:       Date assessed:  Oswestry: raw score =12 ; dysfunction = 24%  2/10/22    Pain level:4 /10     SUBJECTIVE: Patient reports onset of LBP in  after doing more HIT training. 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 Pain can shoot to the knee, and she reports burning in the HS. Patient was referred to PT by her PCP.  She saw Dr. Andreas Marquez today and has MRI ordered for R hip.       OBJECTIVE: See eval   Observation:    Test measurements:      RESTRICTIONS/PRECAUTIONS:     Exercises/Interventions:     Therapeutic Exercise (61699) Resistance / level Sets / Seconds Reps Notes / Cues   TM       IB       HSS   2x30\" B    Seated glut str   2x30\" B                                Mat Ex:  PPU  PPU with sag (breath)  Heel hand rock  Clam  Figure 4 str  TB hip abd  Bridge with add set  SL LE circles  q-ped hip ext  q-ped opp UE/LE     5x  5x  5x  10x B   Very restricted Lx ext   Therapeutic Activities (71454)       Educated on postural awareness at work, body mechanics, Dx and Tx x                                  Neuromuscular Re-ed (88985)       CC: hip ext, abd  CC: multif                                    Manual Intervention ([phone])       Pelvic roll   1x B L with cavatation   SI manip   add    Lx, SI mobs                                                   Pt. Education:  -pt educated on diagnosis, prognosis and expectations for rehab  -all pt questions were answered    Home Exercise Program:  Access Code: [email or web address]  URL: [email or web address]  Date: 02/10/2022  Prepared by: Arabella Sham  Exercises  Hamstring stretch on steps - 2 x daily - 7 x weekly - 1 sets - 3 reps - 30 hold  Seated Figure 4 Piriformis Stretch - 2 x daily - 7 x weekly - 1 sets - 3 reps - 30 hold  Quadruped Rock Back into Prone Press Up - 1 x daily - 7 x weekly - 1 sets - 10 reps  Clamshell - 2 x daily - 7 x weekly - 1-2 sets - 10 reps      Therapeutic Exercise and NMR EXR  [] (35736) Provided verbal/tactile cueing for activities related to strengthening, flexibility, endurance, ROM  for improvements in proximal hip and core control with self care, mobility, lifting and ambulation.  [] (27406) Provided verbal/tactile cueing for activities related to improving balance, coordination, kinesthetic sense, posture, motor skill, proprioception  to assist with core control in self care, mobility, lifting, and ambulation.      Therapeutic Activities:    [] (41233 or 91269) Provided verbal/tactile cueing for activities related to improving balance, coordination, kinesthetic sense, posture, motor skill, proprioception and motor activation to allow for proper function  with self care and ADLs  [] (69552) Provided training and instruction to the patient for proper core and proximal hip recruitment and positioning with ambulation re-education     Home Exercise Program:    [x] (55374) Reviewed/Progressed HEP activities related to strengthening, flexibility, endurance, ROM of core, proximal hip and LE for functional self-care, mobility, lifting and ambulation   [] (41508) Reviewed/Progressed HEP activities related to improving balance, coordination, kinesthetic sense, posture, motor skill, proprioception of core, proximal hip and LE for self care, mobility, lifting, and ambulation      Manual Treatments:  PROM / STM / Oscillations-Mobs:  G-I, II, III, IV (PA's, Inf., Post.)  [] (73227) Provided manual therapy to mobilize proximal hip and LS spine soft tissue/joints for the purpose of modulating pain, promoting relaxation,  increasing ROM, reducing/eliminating soft tissue swelling/inflammation/restriction, improving soft tissue extensibility and allowing for proper ROM for normal function with self care, mobility, lifting and ambulation. Modalities:       Charges:  Timed Code Treatment Minutes: 30   Total Treatment Minutes: 50       [x] EVAL - LOW (28438)   [] EVAL - MOD (42949)  [] EVAL - HIGH (39067)  [] RE-EVAL (21839)  [x] TE (76403) x  1    [] Ionto (56955)  [] NMR (15678) x      [] Vaso (64663)  [] Manual (61356) x      [] Ultrasound  [x] TA (21090) x  1    [] Mech Traction (40208)  [] Gait Training x     [] ES (un) (21009):   [] Aquatic therapy x   [] Other:   [] Group:     Goals:   Patient stated goal:  Reduce pain, create mm balance, restore ROM  []? Progressing: []? Met: []? Not Met: []? Adjusted     Therapist goals for Patient:   Short Term Goals: To be achieved in: 2 weeks  1. Independent in HEP and progression per patient tolerance, in order to prevent re-injury. []? Progressing: []? Met: []? Not Met: []? Adjusted  2. Patient will have a decrease in pain to facilitate improvement in movement, function, and ADLs as indicated by Functional Deficits.   []? Progressing: []? Met: []? Not Met: []? Adjusted     Long Term Goals: To be achieved in:4-6 weeks  1. Disability index score of 18% or less for the YOANNA to assist with reaching prior level of function. []? Progressing: []? Met: []? Not Met: []? Adjusted  2. Patient will demonstrate increased AROM to WNL, good LS mobility, good hip ROM to allow for proper joint functioning as indicated by patients Functional Deficits. []? Progressing: []? Met: []? Not Met: []? Adjusted  3. Patient will demonstrate an increase in Strength to good proximal hip and core activation to allow for proper functional mobility with yoga poses, and sit to stand transfers. []? Progressing: []? Met: []? Not Met: []? Adjusted  4. 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 []? Progressing: []? Met: []? Not Met: []? Adjusted  5. Patient will report 75% improvement in pain and function   []? Progressing: []? Met: []? Not Met: []? Adjusted          Overall Progression Towards Functional goals/ Treatment Progress Update:  [] Patient is progressing as expected towards functional goals listed. [] Progression is slowed due to complexities/Impairments listed. [] Progression has been slowed due to co-morbidities.   [x] Plan just implemented, too soon to assess goals progression <30days   [] Goals require adjustment due to lack of progress  [] Patient is not progressing as expected and requires additional follow up with physician  [] Other    Persisting Functional Limitations/Impairments:  [x]Sitting []Standing   [x]Walking [x]Squatting/bending    []Stairs [x]ADL's    [x]Transfers [x]Reaching  [x]Housework [x]Job related tasks  [x]Driving [x]Sports/Recreation   [x]Sleeping []Other:    ASSESSMENT:  See eval  Treatment/Activity Tolerance:  [x] Patient able to complete tx  [] Patient limited by randal  [] Patient limited by pain  [] Patient limited by other medical complications  [] Other:     Prognosis: [] Good [] Fair  [] Poor    Patient Requires Follow-up: [x] Yes  [] No    Plan for next treatment session:    PLAN: See eval. PT 2x / week for 4-6weeks. [] Continue per plan of care [] Alter current plan (see comments)  [x] Plan of care initiated [] Hold pending MD visit [] Discharge    Electronically signed by: Rodrick Gray, PT MPT [id number]      Note: If patient does not return for scheduled/ recommended follow up visits, this note will serve as a discharge from care along with most recent update on progress.

## 2022-02-10 NOTE — LETTER
[facility]  [address] [facility] [address]  Phone: [phone]  Fax: [phone]    Maynor Gomez MD    February 10, 2022     Isa Perry, [facility] [facility]  [place] [address]    Patient: Maxwell Brown   MR Number: [id number]   YOB: 1975   Date of Visit: 2/10/2022       Dear Isa Perry: Thank you for referring Maxwell Brown to me for evaluation/treatment. Below are the relevant portions of my assessment and plan of care. If you have questions, please do not hesitate to call me. I look forward to following Jn Alfred along with you.     Sincerely,      Maynor Gomez MD

## 2022-02-10 NOTE — PROGRESS NOTES
Date:  2/10/2022    Name:  Lauri Mays  Address:  [address] [place] [address] [facility] [address]    :  1975      Age:   40 y.o.    SSN:  [id number]      Medical Record Number:  [id number]    Reason for Visit:    Chief Complaint    New Patient (TAE HIP PAIN 3-4 YEARS)      DOS:2/10/2022     HPI: Lauri Mays is a 40 y.o. female here today for worsening anterior hip pain for the past number of months with a chronic history of many years of hip pain. She works as a massage therapist but also is a . The pain has become debilitating and is noticeable on nearly a daily basis. The pain is a 5 out of 10. Throbbing dull in nature but is constant. Is worse with any bending exercise squatting running or sitting crosslegged. Sitting for prolonged period of time. It is somewhat associated with some low back pain which was previously investigated and treated with physical therapy successfully a few years ago at [facility] for mild DDD. At this time she denies any persistent back pain. no bowel or bladder symptoms no numbness or tingling or any weakness. She was referred to me by her primary care physician Dr. Darlene Norman      Pain Assessment  Location of Pain: Other (Comment) (Hip)  Location Modifiers: Left,Right  Severity of Pain: 5  Quality of Pain: Throbbing,Dull  Duration of Pain: Persistent  Frequency of Pain: Constant  Aggravating Factors: Bending,Exercise,Squatting (Running; Sitting cross leg; sitting prolonged periods)  Limiting Behavior: Yes  Relieving Factors: Rest,Heat,Other (Comment) (Massage)  Result of Injury: No  Work-Related Injury: No  Are there other pain locations you wish to document?: No  ROS: Review of systems reviewed from Patient History Form completed today and available in the patient's chart under the Media tab.        Past Medical History:   Diagnosis Date    Anxiety         Past Surgical History:   Procedure Laterality Date    BREAST ENHANCEMENT SURGERY Bilateral   BREAST IMPLANT REMOVAL         Family History   Problem Relation Age of Onset    High Blood Pressure Mother     No Known Problems Father     No Known Problems Sister     No Known Problems Brother     No Known Problems Brother     Other Maternal Grandmother         Suicide    Depression Maternal Grandmother         Situational    Parkinsonism Maternal Grandfather     No Known Problems Paternal Grandmother     Heart Disease Paternal Grandfather     Obesity Paternal Grandfather     Depression Daughter     No Known Problems Daughter     No Known Problems Son     Breast Cancer Maternal Aunt 39       Social History     Socioeconomic History    Marital status:      Spouse name: None    Number of children: None    Years of education: None    Highest education level: None   Occupational History    None   Tobacco Use    Smoking status: Never Smoker    Smokeless tobacco: Never Used   Vaping Use    Vaping Use: Never used   Substance and Sexual Activity    Alcohol use: Yes     Comment: Socially    Drug use: Never    Sexual activity: Not Currently     Partners: Male, Female   Other Topics Concern    None   Social History Narrative    None     Social Determinants of Health     Financial Resource Strain: Low Risk     Difficulty of Paying Living Expenses: Not hard at all   Food Insecurity: No Food Insecurity    Worried About Running Out of Food in the Last Year: Never true    Tessa of Food in the Last Year: Never true   Transportation Needs: No Transportation Needs    Lack of Transportation (Medical): No    Lack of Transportation (Non-Medical):  No   Physical Activity: Insufficiently Active    Days of Exercise per Week: 4 days    Minutes of Exercise per Session: 30 min   Stress: Stress Concern Present    Feeling of Stress : Rather much   Social Connections: Socially Isolated    Frequency of Communication with Friends and Family: More than three times a week    Frequency of Social Gatherings with Friends and Family: Twice a week    Attends Islam Services: Never    Active Member of Clubs or Organizations: No    Attends Club or Organization Meetings: Never    Marital Status:    Intimate Partner Violence:     Fear of Current or Ex-Partner: Not on file    Emotionally Abused: Not on file    Physically Abused: Not on file    Sexually Abused: Not on file   Housing Stability: [address] Unable to Pay for Housing in the Last Year: No    Number of [place] in the Last Year: 1    Unstable Housing in the Last Year: No       Current Outpatient Medications   Medication Sig Dispense Refill    Levonorgestrel (MIRENA, 52 MG, IU) by Intrauterine route       No current facility-administered medications for this visit. No Known Allergies    Vital signs:  Ht 5' 5\" (1.651 m)   Wt 110 lb (49.9 kg)   BMI 18.30 kg/m²        Constitutional: The physical examination finds the patient to be well-developed and well-nourished. The patient is alert and oriented x3 and was cooperative throughout the visit. Neuro: no focal deficits noted. Normal mood, judgement, decision making  Eyes: sclera clear, EOMI  Ears: Normal external ear  Mouth:  No perioral lesions  Pulm: Respirations unlabored and regular  Pulse: Extremities well perfused, warm, capillary refill < 2 seconds  Musculoskeletal:    Hip Examination: right    Skin/Inspection: no skin lesions, cellulitis, or extreme edema in the lower extremities. Standing/Walking: Normal c gait, negative Trendelenburg sign.       Sitting Exam: 5/5 Hip Flexor Strength, 5/5 Abductor Strength, 5/5 Adductor strength, negative Straight Leg Raise    Supine Exam: Non tender around the ASIS, AIIS  Flexion arc 0 to 100deg, IR 45 deg, ER 60 deg full range of motion  Stinchfield (resisted SLR) test is negative   Special Tests:  positive Deep Flexion Test, positive  FADIR ,  positive  pain with FLY that is anterior     Side Lying Exam: not tender at greater trochanter, not tender abductor musculature, not tender along course of the TFL . Abductor side leg raise 5/5, loose. OberTest    Prone Exam: Negative hip flexion contracture, internal rotation to 25 deg, external rotation to 45 deg. not tender at the ischial tuberosity/proximal hamstring, tender along the Bilateral sacro-Iliac joints. Distal Neurovascular exam is intact (foot sensation, pulses, and motor exam)      Diagnostics:  Radiology:       Pertinent imaging reviewed, images only - no report available. Radiographs were obtained and reviewed in the office; 3 views: AP Pelvis and left, right hip 45-deg Quinn View, and right False Profile View    Tonnis Grade: grade 0   LCEA: 25 deg  Alpha Angle: 70 deg  Cross over-sign is negative  Other: Negative Coxa Profunda, Negative Protrusio    Impression: CAM KRISHNA, no acute findings. IUD in place       Assessment: Patient is a 40 y.o. female worsening anterior hip pain consistent with an x-ray diagnosis of cam KRISHNA likely causing a labral tear to her hip joint. Impression:  Visit Diagnoses       Codes    Femoroacetabular impingement of right hip    -  Primary M25.851    Tear of right acetabular labrum, initial encounter     S73.191A    Bilateral hip pain     M25.551, M25.552          Office Procedures:  No orders of the defined types were placed in this encounter. Orders Placed This Encounter   Procedures    XR HIP 2-3 VW W PELVIS LEFT    XR HIP 2-3 VW W PELVIS RIGHT    MRI HIP RIGHT WO CONTRAST     Standing Status:   Future     Standing Expiration Date:   2/10/2023     Scheduling Instructions:      [address]            PHONE: [phone]      FAX: [phone]            MRI RIGHT HIP W/O CONTRAST       **MRI'S OF HIPS TO BE READ BY DR. JONO PAREDES, PER DR. José Manuel Jasmine            MRI SCHEDULED FOR: NEEDS SCHEDULED            PLEASE NOTE: IMAGING RESULTS ARE NOT GIVEN OVER THE PHONE. AN IN-OFFICE APPOINTMENT IS REQUIRED UNLESS OTHER ARRANGEMENTS ARE PREVIOUSLY MADE. PLEASE SCHEDULE THIS PRIOR TO LEAVING THE OFFICE TODAY. Plan:  Pertinent imaging was reviewed. The etiology, natural history, and treatment options for the disorder were discussed. The roles of activity medication, antiinflammatories, injections, bracing, physical therapy, and surgical interventions were all described to the patient and questions were answered. We believe Tiana Khanna is a candidate for Further work-up with a dedicated MRI of her right hip to rule out a displaced labral tear secondary to her KRISHNA. Her pain has persisted despite months of activity modification, an extensive exercise program and natural treatment modalities. However, at this point her pain has become quite debilitating and is affecting activities of daily living and not just light recreational activity. We will see her back after the MRI to review the findings and out line the best treatment strategy. All the patient's questions were answered while in the clinic. The patient is understanding of all instructions and agrees with the plan. Approximately 45 minutes was spent on patient education and coordinating care. Follow up in: Return for TR MRI RIGHT HIP. Hip Self assessment forms      Sincerely,    Luz Elena Murillo MD [address] [facility] [address] [place] [address], [id number]  Email: [email or web address]  Office: [phone]      02/10/22  5:06 PM    This dictation was performed with a verbal recognition program (DRAGON) and it was checked for errors. It is possible that there are still dictated errors within this office note. If so, please bring any errors to my attention for an addendum. All efforts were made to ensure that this office note is accurate.

## 2022-02-11 ENCOUNTER — TELEPHONE (OUTPATIENT)
Dept: ORTHOPEDIC SURGERY | Age: 47
End: 2022-02-11

## 2022-02-11 NOTE — TELEPHONE ENCOUNTER
L/m on patient's v/m regarding MRI Right Hip approval and authorization being valid until 02/10/2023. Patient has been instructed to call to schedule MRI Right Hip at [facility]. MRI Right Hip results will not be given over the phone or via [employer]. Patient was also asked to allow 24-48 hours for follow up appointment from MRI scan in order for staff to obtain MRI report. Patient currently has a follow up appointment schedule for NEEDS FOLLOW UP. The patient was advised to contact the office to schedule this appointment to accommodate MRI scan.

## 2022-02-15 ENCOUNTER — HOSPITAL ENCOUNTER (OUTPATIENT)
Dept: PHYSICAL THERAPY | Age: 47
Setting detail: THERAPIES SERIES
Discharge: HOME | End: 2022-02-15
Payer: MEDICARE

## 2022-02-15 NOTE — PROGRESS NOTES
[facility]    Physical Therapy  Cancellation/No-show Note  Patient Name:  Pati Lopez  :  1975   Date:  2/15/2022    Cancelled visits to date: 0  No-shows to date: 1    For today's appointment patient:  []  Cancelled  []  Rescheduled appointment  [x]  No-show  2/15     Reason given by patient:  []  Patient ill  []  Conflicting appointment  []  No transportation    []  Conflict with work  []  No reason given  []  Other:     Comments:      Phone call information:   []  Phone call made today to patient at _ time at number provided:      []  Patient answered, conversation as follows:    []  Patient did not answer, message left as follows:  [x]  Phone call not made today  []  Phone call not needed - pt contacted us to cancel and provided reason for cancellation.      Electronically signed by:  Diana Gotti PT, PT

## 2022-02-17 ENCOUNTER — APPOINTMENT (OUTPATIENT)
Dept: PHYSICAL THERAPY | Age: 47
End: 2022-02-17
Payer: MEDICARE

## 2022-02-18 ENCOUNTER — TELEPHONE (OUTPATIENT)
Dept: ORTHOPEDIC SURGERY | Age: 47
End: 2022-02-18

## 2022-02-22 ENCOUNTER — HOSPITAL ENCOUNTER (OUTPATIENT)
Dept: PHYSICAL THERAPY | Age: 47
Setting detail: THERAPIES SERIES
Discharge: HOME | End: 2022-02-22
Payer: MEDICARE

## 2022-02-22 PROCEDURE — 97140 MANUAL THERAPY 1/> REGIONS: CPT

## 2022-02-22 PROCEDURE — 97110 THERAPEUTIC EXERCISES: CPT

## 2022-02-22 NOTE — FLOWSHEET NOTE
[facility], [facility]  Phone: [phone]  Fax: [phone]    Physical Therapy Treatment Note/ Progress Report:     Date:  2022    Patient Name:  Igor Bay    :  1975  MRN: [id number]  Restrictions/Precautions:    Medical/Treatment Diagnosis Information:  Diagnosis: M54.42, M54.41, G89.29,   LBP  M35.4 diffuse fasciitis  Treatment Diagnosis: LBP with Lx-spine restrictions, poor flexibility and core weakness  Insurance/Certification information:  PT Insurance Information: HERMEL DELOR  Physician Information:  Referring Practitioner: Corinna Kumar MD  Plan of care signed (Y/N): []  Yes  [x]  No     Date of Patient follow up with Physician:      Progress Report: []  Yes  [x]  No     Date Range for reporting period:  Beginnin/10/22  Ending:     Progress report due (10 Rx/or 30 days whichever is less):     Recertification due (POC duration/ or 90 days whichever is less):     Visit # Insurance Allowable Auth required? Date Range   2 30 []  Yes  []  No PCY     Latex Allergy:  [x]NO      []YES  Preferred Language for Healthcare:   [x]English       []other:    Functional Scale:       Date assessed:  Oswestry: raw score =12 ; dysfunction = 24%  2/10/22    Pain level: 2/10     SUBJECTIVE: Pt reports that she is doing well today. Was travelling last week which made her sore/stiff.  Started biofeedback and craniosacral therapy which really seems to be helping.       OBJECTIVE: See eval   Observation:    Test measurements:      RESTRICTIONS/PRECAUTIONS:     Exercises/Interventions:     Therapeutic Exercise (08637) Resistance / level Sets / Seconds Reps Notes / Cues   TM  4'     IB   2x30\"    HSS   2x30\" B    Seated glut str   2x30\" B                                Mat Ex:  PPU  PPU with sag (breath)  Heel hand rock  Clam  Figure 4 str  TB hip abd  Bridge with add set  SL LE circles  q-ped hip ext  q-ped opp UE/LE                2 5x  5x  5x  10x B      10  10 cw/ccw    10 R/L   Very restricted Lx ext   Therapeutic Activities (64993)       Educated on postural awareness at work, body mechanics, Dx and Tx x                                  Neuromuscular Re-ed (81959)       CC: hip ext, abd  CC: multif                                    Manual Intervention ([phone])       Pelvic roll   1x B L with cavatation   SI manip   add    Lx, SI mobs       Roller STM to B hamstrings  7'     Manual glute trigger point release  2'                                   Pt. Education:  -pt educated on diagnosis, prognosis and expectations for rehab  -all pt questions were answered    Home Exercise Program:  Access Code: [email or web address]  URL: [email or web address]  Date: 02/10/2022  Prepared by: Natalie Medina  Exercises  Hamstring stretch on steps - 2 x daily - 7 x weekly - 1 sets - 3 reps - 30 hold  Seated Figure 4 Piriformis Stretch - 2 x daily - 7 x weekly - 1 sets - 3 reps - 30 hold  Quadruped Rock Back into Prone Press Up - 1 x daily - 7 x weekly - 1 sets - 10 reps  Clamshell - 2 x daily - 7 x weekly - 1-2 sets - 10 reps      Therapeutic Exercise and NMR EXR  [] (55009) Provided verbal/tactile cueing for activities related to strengthening, flexibility, endurance, ROM  for improvements in proximal hip and core control with self care, mobility, lifting and ambulation.  [] (94451) Provided verbal/tactile cueing for activities related to improving balance, coordination, kinesthetic sense, posture, motor skill, proprioception  to assist with core control in self care, mobility, lifting, and ambulation.      Therapeutic Activities:    [] (37861 or 06582) Provided verbal/tactile cueing for activities related to improving balance, coordination, kinesthetic sense, posture, motor skill, proprioception and motor activation to allow for proper function  with self care and ADLs  [] (53315) Provided training and instruction to the patient for proper core and proximal hip recruitment and positioning with ambulation re-education     Home Exercise Program:    [x] (79880) Reviewed/Progressed HEP activities related to strengthening, flexibility, endurance, ROM of core, proximal hip and LE for functional self-care, mobility, lifting and ambulation   [] (09970) Reviewed/Progressed HEP activities related to improving balance, coordination, kinesthetic sense, posture, motor skill, proprioception of core, proximal hip and LE for self care, mobility, lifting, and ambulation      Manual Treatments:  PROM / STM / Oscillations-Mobs:  G-I, II, III, IV (PA's, Inf., Post.)  [] (28020) Provided manual therapy to mobilize proximal hip and LS spine soft tissue/joints for the purpose of modulating pain, promoting relaxation,  increasing ROM, reducing/eliminating soft tissue swelling/inflammation/restriction, improving soft tissue extensibility and allowing for proper ROM for normal function with self care, mobility, lifting and ambulation. Modalities:       Charges:  Timed Code Treatment Minutes: 45   Total Treatment Minutes: 45       [] EVAL - LOW (18923)   [] EVAL - MOD (80717)  [] EVAL - HIGH (54990)  [] RE-EVAL (78269)  [x] TE (39199) x  2    [] Ionto (83395)  [] NMR (74214) x      [] Vaso (12330)  [x] Manual (99857) x1      [] Ultrasound  [] TA (60170) x      [] Mech Traction (60306)  [] Gait Training x     [] ES (un) (61553):   [] Aquatic therapy x   [] Other:   [] Group:     Goals:   Patient stated goal:  Reduce pain, create mm balance, restore ROM  []? Progressing: []? Met: []? Not Met: []? Adjusted     Therapist goals for Patient:   Short Term Goals: To be achieved in: 2 weeks  1. Independent in HEP and progression per patient tolerance, in order to prevent re-injury. []? Progressing: []? Met: []? Not Met: []? Adjusted  2. Patient will have a decrease in pain to facilitate improvement in movement, function, and ADLs as indicated by Functional Deficits. []? Progressing: []?  Met: []? Not Met: []? Adjusted     Long Term Goals: To be achieved in:4-6 weeks  1. Disability index score of 18% or less for the YOANNA to assist with reaching prior level of function. []? Progressing: []? Met: []? Not Met: []? Adjusted  2. Patient will demonstrate increased AROM to WNL, good LS mobility, good hip ROM to allow for proper joint functioning as indicated by patients Functional Deficits. []? Progressing: []? Met: []? Not Met: []? Adjusted  3. Patient will demonstrate an increase in Strength to good proximal hip and core activation to allow for proper functional mobility with yoga poses, and sit to stand transfers. []? Progressing: []? Met: []? Not Met: []? Adjusted  4. 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 []? Progressing: []? Met: []? Not Met: []? Adjusted  5. Patient will report 75% improvement in pain and function   []? Progressing: []? Met: []? Not Met: []? Adjusted          Overall Progression Towards Functional goals/ Treatment Progress Update:  [] Patient is progressing as expected towards functional goals listed. [] Progression is slowed due to complexities/Impairments listed. [] Progression has been slowed due to co-morbidities. [x] Plan just implemented, too soon to assess goals progression <30days   [] Goals require adjustment due to lack of progress  [] Patient is not progressing as expected and requires additional follow up with physician  [] Other    Persisting Functional Limitations/Impairments:  [x]Sitting []Standing   [x]Walking [x]Squatting/bending    []Stairs [x]ADL's    [x]Transfers [x]Reaching  [x]Housework [x]Job related tasks  [x]Driving [x]Sports/Recreation   [x]Sleeping []Other:    ASSESSMENT: Pt had good exercise tolerance, willing to try each tx and instructed to communicate any changes in pain.  A little shaky with hip abduction and qped opp arm/leg raises but overall did well. Trial of roller STM to B hamstrings and manual STM to R glute tender point. Treatment/Activity Tolerance:  [x] Patient able to complete tx  [] Patient limited by fatique  [] Patient limited by pain  [] Patient limited by other medical complications  [] Other:     Prognosis: [] Good [] Fair  [] Poor    Patient Requires Follow-up: [x] Yes  [] No    Plan for next treatment session:    PLAN: See eval. PT 2x / week for 4-6weeks. [] Continue per plan of care [] Alter current plan (see comments)  [x] Plan of care initiated [] Hold pending MD visit [] Discharge    Electronically signed by: Sarah Anguiano, PTA [id number]      Note: If patient does not return for scheduled/ recommended follow up visits, this note will serve as a discharge from care along with most recent update on progress.

## 2022-02-24 ENCOUNTER — HOSPITAL ENCOUNTER (OUTPATIENT)
Dept: PHYSICAL THERAPY | Age: 47
Setting detail: THERAPIES SERIES
Discharge: HOME | End: 2022-02-24
Payer: MEDICARE

## 2022-02-24 PROCEDURE — 97110 THERAPEUTIC EXERCISES: CPT | Performed by: PHYSICAL THERAPIST

## 2022-02-24 PROCEDURE — 97140 MANUAL THERAPY 1/> REGIONS: CPT | Performed by: PHYSICAL THERAPIST

## 2022-02-24 NOTE — FLOWSHEET NOTE
[facility]  Phone: [phone]  Fax: [phone]    Physical Therapy Treatment Note/ Progress Report:     Date:  2022    Patient Name:  Holden Cabezas    :  1975  MRN: [id number]  Restrictions/Precautions:    Medical/Treatment Diagnosis Information:  Diagnosis: M54.42, M54.41, G89.29,   LBP  M35.4 diffuse fasciitis  Treatment Diagnosis: LBP with Lx-spine restrictions, poor flexibility and core weakness  Insurance/Certification information:  PT Insurance Information: [employer]  Physician Information:  Referring Practitioner: Margaret Mckeon MD  Plan of care signed (Y/N): []  Yes  [x]  No     Date of Patient follow up with Physician:      Progress Report: []  Yes  [x]  No     Date Range for reporting period:  Beginnin/10/22  Ending:     Progress report due (10 Rx/or 30 days whichever is less):     Recertification due (POC duration/ or 90 days whichever is less):     Visit # Insurance Allowable Auth required? Date Range   3 30 []  Yes  []  No PCY     Latex Allergy:  [x]NO      []YES  Preferred Language for Healthcare:   [x]English       []other:    Functional Scale:       Date assessed:  Oswestry: raw score =12 ; dysfunction = 24%  2/10/22    Pain level: 2/10     HX: Patient reports onset of LBP in  after doing more HIT training. 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 Pain can shoot to the knee, and she reports burning in the HS. Patient was referred to PT by her PCP. She saw Dr. Char Chavez today and has MRI ordered for R hip.      SUBJECTIVE: Pt reports that she is doing well today. Biofeedback and craniosacral therapy seem to be helping in conjunction with PT. Pt has stress at home but attempts to manage.  Main c/o today is tightness in R hip/glut.      OBJECTIVE: See eval   Observation:    Test measurements:      RESTRICTIONS/PRECAUTIONS:     Exercises/Interventions: Therapeutic Exercise (91788) Resistance / level Sets / Seconds Reps Notes / Cues   TM 2.5 PH 5'     IB   2x30\"    HSS   2x30\" B    Seated glut str   2x30\" B                                Mat Ex:  PPU  PPU with sag (breath)  Heel hand rock  TB Clam  Figure 4 str  TB hip abd  Bridge with add set  SL LE circles  q-ped hip abd  q-ped opp UE/LE  ABs         green               2   5x  5x  10x  10x B  HEP    10  10 cw/ccw  10 R/L  10 R/L  add   Very restricted Lx ext   Therapeutic Activities (93331)       Educated on postural awareness at work, body mechanics, Dx and Tx x                                  Neuromuscular Re-ed (36727)       CC: hip ext, abd  CC: multif                                    Manual Intervention (31210)       Pelvic roll   1x B L with cavatation   Manual Stretch to B hip flexors, glut and HS   5' L hip flexor  And HS tighter          Roller STM to B hamstrings, gluts  5'     Manual glute trigger point release                                     Pt. Education:  -pt educated on diagnosis, prognosis and expectations for rehab  -all pt questions were answered    Home Exercise Program:  2/24: TB clam    Access Code: [email or web address]  URL: [email or web address]  Date: 02/10/2022  Prepared by: Anai Travis  Exercises  Hamstring stretch on steps - 2 x daily - 7 x weekly - 1 sets - 3 reps - 30 hold  Seated Figure 4 Piriformis Stretch - 2 x daily - 7 x weekly - 1 sets - 3 reps - 30 hold  Quadruped Rock Back into Prone Press Up - 1 x daily - 7 x weekly - 1 sets - 10 reps  Clamshell - 2 x daily - 7 x weekly - 1-2 sets - 10 reps      Therapeutic Exercise and NMR EXR  [x] (85968) Provided verbal/tactile cueing for activities related to strengthening, flexibility, endurance, ROM  for improvements in proximal hip and core control with self care, mobility, lifting and ambulation.  [] (78898) Provided verbal/tactile cueing for activities related to improving balance, coordination, kinesthetic sense, posture, motor skill, proprioception  to assist with core control in self care, mobility, lifting, and ambulation. Therapeutic Activities:    [] (49060 or 23160) Provided verbal/tactile cueing for activities related to improving balance, coordination, kinesthetic sense, posture, motor skill, proprioception and motor activation to allow for proper function  with self care and ADLs  [] (07675) Provided training and instruction to the patient for proper core and proximal hip recruitment and positioning with ambulation re-education     Home Exercise Program:    [x] (83042) Reviewed/Progressed HEP activities related to strengthening, flexibility, endurance, ROM of core, proximal hip and LE for functional self-care, mobility, lifting and ambulation   [] (19823) Reviewed/Progressed HEP activities related to improving balance, coordination, kinesthetic sense, posture, motor skill, proprioception of core, proximal hip and LE for self care, mobility, lifting, and ambulation      Manual Treatments:  PROM / STM / Oscillations-Mobs:  G-I, II, III, IV (PA's, Inf., Post.)  [x] (56202) Provided manual therapy to mobilize proximal hip and LS spine soft tissue/joints for the purpose of modulating pain, promoting relaxation,  increasing ROM, reducing/eliminating soft tissue swelling/inflammation/restriction, improving soft tissue extensibility and allowing for proper ROM for normal function with self care, mobility, lifting and ambulation.      Modalities:       Charges:  Timed Code Treatment Minutes: 45   Total Treatment Minutes: 45       [] EVAL - LOW (55696)   [] EVAL - MOD (67769)  [] EVAL - HIGH (22296)  [] RE-EVAL (50671)  [x] TE (80123) x  2    [] Ionto (73531)  [] NMR (98809) x      [] Vaso (71093)  [x] Manual (39509) x1      [] Ultrasound  [] TA (25485) x      [] Mech Traction (31094)  [] Gait Training x     [] ES (un) (38485):   [] Aquatic therapy x   [] Other:   [] Group:     Goals:   Patient stated goal:  Reduce pain, create mm balance, restore ROM  [x]? Progressing: []? Met: []? Not Met: []? Adjusted     Therapist goals for Patient:   Short Term Goals: To be achieved in: 2 weeks  1. Independent in HEP and progression per patient tolerance, in order to prevent re-injury. [x]? Progressing: []? Met: []? Not Met: []? Adjusted  2. Patient will have a decrease in pain to facilitate improvement in movement, function, and ADLs as indicated by Functional Deficits. [x]? Progressing: []? Met: []? Not Met: []? Adjusted     Long Term Goals: To be achieved in:4-6 weeks  1. Disability index score of 18% or less for the YOANNA to assist with reaching prior level of function. [x]? Progressing: []? Met: []? Not Met: []? Adjusted  2. Patient will demonstrate increased AROM to WNL, good LS mobility, good hip ROM to allow for proper joint functioning as indicated by patients Functional Deficits. [x]? Progressing: []? Met: []? Not Met: []? Adjusted  3. Patient will demonstrate an increase in Strength to good proximal hip and core activation to allow for proper functional mobility with yoga poses, and sit to stand transfers. [x]? Progressing: []? Met: []? Not Met: []? Adjusted  4. 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 [x]? Progressing: []? Met: []? Not Met: []? Adjusted  5. Patient will report 75% improvement in pain and function   [x]? Progressing: []? Met: []? Not Met: []? Adjusted          Overall Progression Towards Functional goals/ Treatment Progress Update:  [] Patient is progressing as expected towards functional goals listed. [] Progression is slowed due to complexities/Impairments listed. [] Progression has been slowed due to co-morbidities.   [x] Plan just implemented, too soon to assess goals progression <30days   [] Goals require adjustment due to lack of progress  [] Patient is not progressing as expected and requires additional follow up with physician  [] Other    Persisting Functional Limitations/Impairments:  [x]Sitting []Standing   [x]Walking [x]Squatting/bending    []Stairs [x]ADL's    [x]Transfers [x]Reaching  [x]Housework [x]Job related tasks  [x]Driving [x]Sports/Recreation   [x]Sleeping []Other:    ASSESSMENT: Pt had good exercise tolerance. Hip mm are weak, added TB to clams this date. Also added manual stretches. Cont with pelvic roll with improved mobility noted afterwards. Treatment/Activity Tolerance:  [x] Patient able to complete tx  [] Patient limited by fatique  [] Patient limited by pain  [] Patient limited by other medical complications  [] Other:     Prognosis: [] Good [] Fair  [] Poor    Patient Requires Follow-up: [x] Yes  [] No    Plan for next treatment session:    PLAN: See eval. PT 2x / week for 4-6weeks. [x] Continue per plan of care [] Alter current plan (see comments)  [] Plan of care initiated [] Hold pending MD visit [] Discharge    Electronically signed by: Caro Bruce, PTMPT [id number]      Note: If patient does not return for scheduled/ recommended follow up visits, this note will serve as a discharge from care along with most recent update on progress.

## 2022-03-01 ENCOUNTER — HOSPITAL ENCOUNTER (OUTPATIENT)
Dept: PHYSICAL THERAPY | Age: 47
Setting detail: THERAPIES SERIES
Discharge: HOME | End: 2022-03-01
Payer: MEDICARE

## 2022-03-01 NOTE — PROGRESS NOTES
[facility]    Physical Therapy  Cancellation/No-show Note  Patient Name:  Igor Bay  :  1975   Date:  3/1/2022    Cancelled visits to date: 0  No-shows to date: 2    For today's appointment patient:  []  Cancelled  []  Rescheduled appointment  [x]  No-show  2/15, 3/1     Reason given by patient:  []  Patient ill  []  Conflicting appointment  []  No transportation    []  Conflict with work  []  No reason given  []  Other:     Comments:      Phone call information:   []  Phone call made today to patient at _ time at number provided:      []  Patient answered, conversation as follows:    []  Patient did not answer, message left as follows:  [x]  Phone call not made today  []  Phone call not needed - pt contacted us to cancel and provided reason for cancellation.      Electronically signed by:  Ernst Hare PT, MPT

## 2022-03-03 ENCOUNTER — HOSPITAL ENCOUNTER (OUTPATIENT)
Dept: PHYSICAL THERAPY | Age: 47
Setting detail: THERAPIES SERIES
Discharge: HOME | End: 2022-03-03
Payer: MEDICARE

## 2022-03-03 PROCEDURE — 97140 MANUAL THERAPY 1/> REGIONS: CPT | Performed by: PHYSICAL THERAPIST

## 2022-03-03 PROCEDURE — 97530 THERAPEUTIC ACTIVITIES: CPT | Performed by: PHYSICAL THERAPIST

## 2022-03-03 PROCEDURE — 97110 THERAPEUTIC EXERCISES: CPT | Performed by: PHYSICAL THERAPIST

## 2022-03-03 PROCEDURE — 97112 NEUROMUSCULAR REEDUCATION: CPT | Performed by: PHYSICAL THERAPIST

## 2022-03-03 NOTE — FLOWSHEET NOTE
[facility]  Phone: [phone]  Fax: [phone]    Physical Therapy Treatment Note/ Progress Report:     Date:  3/3/2022    Patient Name:  Mariah Collazo    :  1975  MRN: [id number]  Restrictions/Precautions:    Medical/Treatment Diagnosis Information:  Diagnosis: M54.42, M54.41, G89.29,   LBP  M35.4 diffuse fasciitis  Treatment Diagnosis: LBP with Lx-spine restrictions, poor flexibility and core weakness  Insurance/Certification information:  PT Insurance Information: [employer]  Physician Information:  Referring Practitioner: Angelica Vides MD  Plan of care signed (Y/N): []  Yes  [x]  No     Date of Patient follow up with Physician:      Progress Report: []  Yes  [x]  No     Date Range for reporting period:  Beginnin/10/22  Ending:     Progress report due (10 Rx/or 30 days whichever is less):     Recertification due (POC duration/ or 90 days whichever is less):     Visit # Insurance Allowable Auth required? Date Range   4 30 []  Yes  []  No PCY     Latex Allergy:  [x]NO      []YES  Preferred Language for Healthcare:   [x]English       []other:    Functional Scale:       Date assessed:  Oswestry: raw score =12 ; dysfunction = 24%  2/10/22    Pain level: 2/10     HX: Patient reports onset of LBP in  after doing more HIT training. 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 Pain can shoot to the knee, and she reports burning in the HS. Patient was referred to PT by her PCP. She saw Dr. Jef Gray today and has MRI ordered for R hip.      SUBJECTIVE: Pt reports that hip is feeling a little better.  She has had increased stress with her daughter in the last week and thinks her stress contributes to her pain all over.       OBJECTIVE: See eval   Observation:    Test measurements:      RESTRICTIONS/PRECAUTIONS:     Exercises/Interventions:     Therapeutic Exercise () Resistance / level Sets / Seconds Reps Notes / Cues   TM 2.5 PH 5'     IB   3x30\"    HSS   2x30\" B    Seated glut str    HEP                               Mat Ex:  PPU  PPU with sag (breath)  Heel hand rock  TB Clam  Figure 4 str    Bridge with add set  SL LE circles  q-ped hip abd  q-ped opp UE/LE  ABs         green               2     5x    HEP    10  10 cw/ccw  15 R/L  10 R/L  add       Improved Lx ext noted 3/3  HEP   Therapeutic Activities (25058)       Educated on postural awareness at work, body mechanics, Dx and Tx x                                  Neuromuscular Re-ed (28539)       CC: hip ext, abd  CC: multif  5#  10#  10 ea L/R  4x L/R Pop noted with L hip ext  3x punch at end of rope          BOSU  Squat  Lunge  plank  Hip ext with plank   Ball down  Ball up  [place] down    10  10 L/R  2x30\"  add                  Manual Intervention (99572)       Pelvic roll   1x B L with cavatation   Manual Stretch to B hip flexors, glut and HS   5' L hip flexor  And HS tighter          Roller STM to B hamstrings, gluts  5'     Manual glute trigger point release                                     Pt. Education:  -pt educated on diagnosis, prognosis and expectations for rehab  -all pt questions were answered    Home Exercise Program:  2/24: TB clam    Access Code: [email or web address]  URL: [email or web address]  Date: 02/10/2022  Prepared by: Chel Nance  Exercises  Hamstring stretch on steps - 2 x daily - 7 x weekly - 1 sets - 3 reps - 30 hold  Seated Figure 4 Piriformis Stretch - 2 x daily - 7 x weekly - 1 sets - 3 reps - 30 hold  Quadruped Rock Back into Prone Press Up - 1 x daily - 7 x weekly - 1 sets - 10 reps  Clamshell - 2 x daily - 7 x weekly - 1-2 sets - 10 reps      Therapeutic Exercise and NMR EXR  [x] (81032) Provided verbal/tactile cueing for activities related to strengthening, flexibility, endurance, ROM  for improvements in proximal hip and core control with self care, mobility, lifting and ambulation.  [] (22743) Provided verbal/tactile cueing for activities related to improving balance, coordination, kinesthetic sense, posture, motor skill, proprioception  to assist with core control in self care, mobility, lifting, and ambulation. Therapeutic Activities:    [] (39717 or 02865) Provided verbal/tactile cueing for activities related to improving balance, coordination, kinesthetic sense, posture, motor skill, proprioception and motor activation to allow for proper function  with self care and ADLs  [] (29367) Provided training and instruction to the patient for proper core and proximal hip recruitment and positioning with ambulation re-education     Home Exercise Program:    [x] (50564) Reviewed/Progressed HEP activities related to strengthening, flexibility, endurance, ROM of core, proximal hip and LE for functional self-care, mobility, lifting and ambulation   [] (39441) Reviewed/Progressed HEP activities related to improving balance, coordination, kinesthetic sense, posture, motor skill, proprioception of core, proximal hip and LE for self care, mobility, lifting, and ambulation      Manual Treatments:  PROM / STM / Oscillations-Mobs:  G-I, II, III, IV (PA's, Inf., Post.)  [x] (91234) Provided manual therapy to mobilize proximal hip and LS spine soft tissue/joints for the purpose of modulating pain, promoting relaxation,  increasing ROM, reducing/eliminating soft tissue swelling/inflammation/restriction, improving soft tissue extensibility and allowing for proper ROM for normal function with self care, mobility, lifting and ambulation.      Modalities:       Charges:  Timed Code Treatment Minutes: 45   Total Treatment Minutes: 45       [] EVAL - LOW (72135)   [] EVAL - MOD (03579)  [] EVAL - HIGH (43534)  [] RE-EVAL (54553)  [x] TE (58198) x  1    [] Ionto (69292)  [x] NMR (35932) x  1    [] Vaso (19635)  [x] Manual (74520) x1      [] Ultrasound  [] TA (72794) x      [] Mech Traction (85471)  [] Gait Training x     [] ES () (96389):   [] Aquatic therapy x   [] Other:   [] Group:     Goals:   Patient stated goal:  Reduce pain, create mm balance, restore ROM  [x]? Progressing: []? Met: []? Not Met: []? Adjusted     Therapist goals for Patient:   Short Term Goals: To be achieved in: 2 weeks  1. Independent in HEP and progression per patient tolerance, in order to prevent re-injury. [x]? Progressing: []? Met: []? Not Met: []? Adjusted  2. Patient will have a decrease in pain to facilitate improvement in movement, function, and ADLs as indicated by Functional Deficits. [x]? Progressing: []? Met: []? Not Met: []? Adjusted     Long Term Goals: To be achieved in:4-6 weeks  1. Disability index score of 18% or less for the YOANNA to assist with reaching prior level of function. [x]? Progressing: []? Met: []? Not Met: []? Adjusted  2. Patient will demonstrate increased AROM to WNL, good LS mobility, good hip ROM to allow for proper joint functioning as indicated by patients Functional Deficits. [x]? Progressing: []? Met: []? Not Met: []? Adjusted  3. Patient will demonstrate an increase in Strength to good proximal hip and core activation to allow for proper functional mobility with yoga poses, and sit to stand transfers. [x]? Progressing: []? Met: []? Not Met: []? Adjusted  4. 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 [x]? Progressing: []? Met: []? Not Met: []? Adjusted  5. Patient will report 75% improvement in pain and function   [x]? Progressing: []? Met: []? Not Met: []? Adjusted          Overall Progression Towards Functional goals/ Treatment Progress Update:  [] Patient is progressing as expected towards functional goals listed. [] Progression is slowed due to complexities/Impairments listed. [] Progression has been slowed due to co-morbidities.   [x] Plan just implemented, too soon to assess goals progression <30days   [] Goals require adjustment due to lack of progress  [] Patient is not progressing as expected and requires additional follow up with physician  [] Other    Persisting Functional Limitations/Impairments:  [x]Sitting []Standing   [x]Walking [x]Squatting/bending    []Stairs [x]ADL's    [x]Transfers [x]Reaching  [x]Housework [x]Job related tasks  [x]Driving [x]Sports/Recreation   [x]Sleeping []Other:    ASSESSMENT: Pt had good exercise tolerance. Added BOSU and CC this date. Cont manual stretches and pelvic roll with improved mobility noted afterwards. Treatment/Activity Tolerance:  [x] Patient able to complete tx  [] Patient limited by fatique  [] Patient limited by pain  [] Patient limited by other medical complications  [] Other:     Prognosis: [] Good [] Fair  [] Poor    Patient Requires Follow-up: [x] Yes  [] No    Plan for next treatment session:    PLAN: See eval. PT 2x / week for 4-6weeks. [x] Continue per plan of care [] Alter current plan (see comments)  [] Plan of care initiated [] Hold pending MD visit [] Discharge    Electronically signed by: CLAY Pierce [id number]      Note: If patient does not return for scheduled/ recommended follow up visits, this note will serve as a discharge from care along with most recent update on progress.

## 2022-03-10 ENCOUNTER — TELEPHONE (OUTPATIENT)
Dept: ORTHOPEDIC SURGERY | Age: 47
End: 2022-03-10

## 2022-03-10 DIAGNOSIS — M54.50 PAIN OF LUMBAR SPINE: Primary | ICD-10-CM

## 2022-03-10 NOTE — TELEPHONE ENCOUNTER
Per tisha Madden to order MRI lumbar spine as well. Notified patient MRI lumbar spine will be ordered and she will be contacted regarding scheduling once approved. She has not yet scheduled MRI Right hip and will wait until MRI lumbar spine is approved in an attempt to schedule both MRI's same day. All questions answered.

## 2022-03-10 NOTE — TELEPHONE ENCOUNTER
General Question     Subject: PATIENT IS GOING TO HAVE AN MRI FOR HER RIGHT HIP. SHE WOULD LIKE TO KNOW IF AN ORDER CAN BE PLACED FOR HER LUMBAR AREA AT THE SAME TIME. PLEASE ADVISE.     Patient:  Aura Corpus Number: [phone]

## 2022-03-11 ENCOUNTER — TELEPHONE (OUTPATIENT)
Dept: ORTHOPEDIC SURGERY | Age: 47
End: 2022-03-11

## 2022-03-11 NOTE — TELEPHONE ENCOUNTER
L/m on patient's v/m regarding MRI Lumbar Spine approval and authorization being valid until 03/10/2023. Patient has been instructed to call to schedule for MRI Lumbar Spine at [facility]. MRI Lumbar Spine results will not be given over the phone or via [employer]. Patient was also asked to allow 24-48 hours for follow up appointment from MRI scan in order for staff to obtain MRI report. Patient currently has a follow up appointment schedule for NEEDS FOLLOW UP. The patient was advised to contact the office to schedule this appointment to accommodate MRI scan.

## 2022-03-16 ENCOUNTER — PATIENT MESSAGE (OUTPATIENT)
Dept: PRIMARY CARE CLINIC | Age: 47
End: 2022-03-16

## 2022-03-16 RX ORDER — DULOXETIN HYDROCHLORIDE 30 MG/1
30 CAPSULE, DELAYED RELEASE ORAL DAILY
Qty: 30 CAPSULE | Refills: 3 | Status: SHIPPED | OUTPATIENT
Start: 2022-03-16 | End: 2022-04-04 | Stop reason: SDUPTHER

## 2022-03-16 NOTE — TELEPHONE ENCOUNTER
From: Jose Foster  To: Dr. Kirill Willis: 3/16/2022 12:55 PM EDT  Subject: Cymbalta prescription    Hello, I wanted to know what my beginning dose for this medication was when I started last summer? I weaned off of it in November but am thinking I may want to try it again. I have some of the medication left over and wondered if I could just start or do I need to start at a lower dose If I do decide to try again.  THank you, Yasmine Garcia

## 2022-03-16 NOTE — TELEPHONE ENCOUNTER
Pola Brenner, weaned herself off of the [address] in November and would like to start back up at her beginning dosage which looked like it was 30mg.

## 2022-03-21 ENCOUNTER — TELEPHONE (OUTPATIENT)
Dept: ORTHOPEDIC SURGERY | Age: 47
End: 2022-03-21

## 2022-03-21 PROBLEM — F33.1 MODERATE EPISODE OF RECURRENT MAJOR DEPRESSIVE DISORDER (HCC): Chronic | Status: RESOLVED | Noted: 2020-08-02 | Resolved: 2022-03-21

## 2022-03-21 NOTE — TELEPHONE ENCOUNTER
Fannie Saucedo REQUESTED DR. VILLANUEVA READ THE MRI BUT DR. VILLANUEVA IS ON VACATION UNTIL NEXT WEEK, IS THERE ANOTHER DOCTOR DR. Jamey Flores WOULD LIKE TO HAVE READ THE MRI? OR SHOULD THEY HOLD OFF UNTIL HE RETURNS FROM VACATION? PLEASE CALL: [phone] OPT 3 AND ASK TO SPEAK W/ Pako Hendrix AT THE [facility] LOCATION.

## 2022-03-22 NOTE — TELEPHONE ENCOUNTER
S/w [employer] reading services regarding earlier message. Dr. Edgar Siddiqui is okay to read MRI as Dr. Kervin Solomon is on vacation. However, if Dr. Edgar Siddiqui is unable as well, another radiologist is fine so that patient does not have to wait to obtain results. All questions answered.

## 2022-04-04 ENCOUNTER — OFFICE VISIT (OUTPATIENT)
Dept: PRIMARY CARE CLINIC | Age: 47
End: 2022-04-04
Payer: MEDICARE

## 2022-04-04 VITALS
BODY MASS INDEX: 19.53 KG/M2 | HEIGHT: 65 IN | WEIGHT: 117.2 LBS | DIASTOLIC BLOOD PRESSURE: 80 MMHG | SYSTOLIC BLOOD PRESSURE: 124 MMHG | HEART RATE: 123 BPM | OXYGEN SATURATION: 99 %

## 2022-04-04 DIAGNOSIS — Z12.11 COLON CANCER SCREENING: ICD-10-CM

## 2022-04-04 DIAGNOSIS — F41.9 ANXIETY: ICD-10-CM

## 2022-04-04 DIAGNOSIS — Z00.00 PREVENTATIVE HEALTH CARE: Primary | ICD-10-CM

## 2022-04-04 PROCEDURE — 99396 PREV VISIT EST AGE 40-64: CPT | Performed by: FAMILY MEDICINE

## 2022-04-04 RX ORDER — DULOXETIN HYDROCHLORIDE 30 MG/1
30 CAPSULE, DELAYED RELEASE ORAL DAILY
Qty: 90 CAPSULE | Refills: 2 | Status: SHIPPED | OUTPATIENT
Start: 2022-04-04 | End: 2022-09-14 | Stop reason: SDUPTHER

## 2022-04-04 RX ORDER — ALPRAZOLAM 0.5 MG/1
0.5 TABLET ORAL NIGHTLY PRN
Qty: 30 TABLET | Refills: 0 | Status: SHIPPED | OUTPATIENT
Start: 2022-04-04 | End: 2022-04-13 | Stop reason: SDUPTHER

## 2022-04-04 ASSESSMENT — PATIENT HEALTH QUESTIONNAIRE - PHQ9
SUM OF ALL RESPONSES TO PHQ QUESTIONS 1-9: 0
2. FEELING DOWN, DEPRESSED OR HOPELESS: 0
SUM OF ALL RESPONSES TO PHQ QUESTIONS 1-9: 4
4. FEELING TIRED OR HAVING LITTLE ENERGY: 0
9. THOUGHTS THAT YOU WOULD BE BETTER OFF DEAD, OR OF HURTING YOURSELF: 0
7. TROUBLE CONCENTRATING ON THINGS, SUCH AS READING THE NEWSPAPER OR WATCHING TELEVISION: 2
SUM OF ALL RESPONSES TO PHQ QUESTIONS 1-9: 4
1. LITTLE INTEREST OR PLEASURE IN DOING THINGS: 0
6. FEELING BAD ABOUT YOURSELF - OR THAT YOU ARE A FAILURE OR HAVE LET YOURSELF OR YOUR FAMILY DOWN: 0
8. MOVING OR SPEAKING SO SLOWLY THAT OTHER PEOPLE COULD HAVE NOTICED. OR THE OPPOSITE, BEING SO FIGETY OR RESTLESS THAT YOU HAVE BEEN MOVING AROUND A LOT MORE THAN USUAL: 2
SUM OF ALL RESPONSES TO PHQ QUESTIONS 1-9: 0
SUM OF ALL RESPONSES TO PHQ9 QUESTIONS 1 & 2: 0
SUM OF ALL RESPONSES TO PHQ QUESTIONS 1-9: 0
SUM OF ALL RESPONSES TO PHQ QUESTIONS 1-9: 4
SUM OF ALL RESPONSES TO PHQ QUESTIONS 1-9: 0
1. LITTLE INTEREST OR PLEASURE IN DOING THINGS: 0
3. TROUBLE FALLING OR STAYING ASLEEP: 0
SUM OF ALL RESPONSES TO PHQ9 QUESTIONS 1 & 2: 0
5. POOR APPETITE OR OVEREATING: 0
SUM OF ALL RESPONSES TO PHQ QUESTIONS 1-9: 4
10. IF YOU CHECKED OFF ANY PROBLEMS, HOW DIFFICULT HAVE THESE PROBLEMS MADE IT FOR YOU TO DO YOUR WORK, TAKE CARE OF THINGS AT HOME, OR GET ALONG WITH OTHER PEOPLE: 1
2. FEELING DOWN, DEPRESSED OR HOPELESS: 0

## 2022-04-04 ASSESSMENT — ENCOUNTER SYMPTOMS
SORE THROAT: 0
DIARRHEA: 0
CHEST TIGHTNESS: 0
ABDOMINAL PAIN: 0
RHINORRHEA: 0
BACK PAIN: 1
SHORTNESS OF BREATH: 0
CONSTIPATION: 0

## 2022-04-04 NOTE — PROGRESS NOTES
Subjective:   Patient ID: Chema Aiken is a 40 y.o. female. HPI by clinical support staff:   Chief Complaint   Patient presents with    Annual Exam     no conerns       Preliminary data above this line collected by clinical support staff.    ______________________________________________________________________  HPI by Provider:   HPI   Annual Exam-Premenopausal: Patient presents for annual exam. The patient has no complaints today. The patient is sexually active. The patient wears seatbelts: yes. last pap: was normal  The patient has regular exercise: yes. The patient has ever been transfused or tattooed?: not asked. The patient reports that domestic violence in her life is absent. Back pain persistent- seeing ortho next week- has MRI scheduled. Anxiety better- Cymbalta helping- sleep interruptions but takes Kava root. Data above this line collected by Provider. Patient's medications, allergies, past medical, surgical, social and family histories were reviewed and updated as appropriate. Patient Care Team:  Ezequiel Villanueva MD as PCP - General (Family Medicine)  Ezequiel Villanueva MD as PCP - [facility] Provider  Current Outpatient Medications on File Prior to Visit   Medication Sig Dispense Refill    Levonorgestrel (MIRENA, 52 MG, IU) by Intrauterine route       No current facility-administered medications on file prior to visit. Review of Systems   Constitutional: Negative for activity change, appetite change, fatigue and fever. HENT: Negative for congestion, rhinorrhea and sore throat. Respiratory: Negative for chest tightness and shortness of breath. Cardiovascular: Negative for chest pain, palpitations and leg swelling. Gastrointestinal: Negative for abdominal pain, constipation and diarrhea. Genitourinary: Negative for dysuria and frequency. Musculoskeletal: Positive for arthralgias and back pain. Neurological: Negative for dizziness, weakness and headaches. Psychiatric/Behavioral: Negative for hallucinations. All other systems reviewed and are negative. ROS above this line reviewed by Provider. Objective:   /80   Pulse 123   Ht 5' 5\" (1.651 m)   Wt 117 lb 3.2 oz (53.2 kg)   SpO2 99%   BMI 19.50 kg/m²   Physical Exam  Vitals and nursing note reviewed. Constitutional:       General: She is not in acute distress. Appearance: Normal appearance. She is normal weight. She is not ill-appearing, toxic-appearing or diaphoretic. HENT:      Head: Normocephalic and atraumatic. Right Ear: Tympanic membrane, ear canal and external ear normal. There is no impacted cerumen. Left Ear: Tympanic membrane, ear canal and external ear normal. There is no impacted cerumen. Nose: Nose normal. No congestion or rhinorrhea. Mouth/Throat:      Mouth: Mucous membranes are moist.      Pharynx: Oropharynx is clear. No oropharyngeal exudate or posterior oropharyngeal erythema. Eyes:      General: No scleral icterus. Conjunctiva/sclera: Conjunctivae normal.   Cardiovascular:      Rate and Rhythm: Normal rate and regular rhythm. Heart sounds: Normal heart sounds. No murmur heard. No friction rub. No gallop. Pulmonary:      Effort: Pulmonary effort is normal. No respiratory distress. Breath sounds: Normal breath sounds. No stridor. No wheezing, rhonchi or rales. Musculoskeletal:      Cervical back: Normal range of motion and neck supple. Lymphadenopathy:      Cervical: No cervical adenopathy. Skin:     General: Skin is warm and dry. Neurological:      Mental Status: She is alert. Psychiatric:         Mood and Affect: Mood normal.         Behavior: Behavior normal.       Assessment and Plan:   1. Preventative health care  During this preventive care visit, the following were discussed and/or reviewed for appropriateness for this patient:  · Healthy diet and exercise habits.     · Importance of self skin exam and sun protection  · Colon Cancer screening and approaches to screening. · Age recommendations for breast and cervical cancer screening. Reviewed current differences in recommendations and risks/benefits of screening. · Bone density screening and recommendations per age and health history. · Reviewed need for lipid and glucose screening. · Reviewed immunization history. · Need for HIV or additional STD screening. · Need for tobacco/substance abuse cessation. · Appropriate quantity of alcohol intake. - CBC with Auto Differential; Future  - Comprehensive Metabolic Panel; Future  - Hemoglobin A1C; Future  - TSH with Reflex to FT4; Future  - Lipid Panel; Future    2. Anxiety  Chronic stable  Continue current regimen.  - ALPRAZolam (XANAX) 0.5 MG tablet; Take 1 tablet by mouth nightly as needed for Sleep for up to 30 days. Dispense: 30 tablet; Refill: 0  - DULoxetine (CYMBALTA) 30 MG extended release capsule; Take 1 capsule by mouth daily  Dispense: 90 capsule; Refill: 2    3. Colon cancer screening  - Cologuard           This chart note was prepared using a voice recognition dictation program. This note was reviewed for accuracy; however, addition, deletion and sound-alike word errors may occur. If there are any questions regarding this chart note, please contact the originating provider. Electronically signed by   Edi Gusman MD  4/4/2022   12:52 PM    Return in about 3 months (around 7/4/2022).

## 2022-04-04 NOTE — PATIENT INSTRUCTIONS
Patient Education        Eating Healthy Foods: Care Instructions  Your Care Instructions     Eating healthy foods can help lower your risk for disease. Healthy food gives you energy and keeps your heart strong, your brain active, your musclesworking, and your bones strong. A healthy diet includes a variety of foods from the basic food groups: grains, vegetables, fruits, milk and milk products, and meat and beans. Some people may eat more of their favorite foods from only one food group and, as a result, miss getting the nutrients they need. So, it is important to pay attention not only to what you eat but also to what you are missing from your diet. You caneat a healthy, balanced diet by making a few small changes. Follow-up care is a key part of your treatment and safety. Be sure to make and go to all appointments, and call your doctor if you are having problems. It's also a good idea to know your test results and keep alist of the medicines you take. How can you care for yourself at home? Look at what you eat   Keep a food diary for a week or two and record everything you eat or drink. Track the number of servings you eat from each food group.  For a balanced diet every day, eat a variety of:  ? 6 or more ounce-equivalents of grains, such as cereals, breads, crackers, rice, or pasta, every day. An ounce-equivalent is 1 slice of bread, 1 cup of ready-to-eat cereal, or ½ cup of cooked rice, cooked pasta, or cooked cereal.  ? 2½ cups of vegetables, especially:  - Dark-green vegetables such as broccoli and spinach.  - Orange vegetables such as carrots and sweet potatoes. - Dry beans (such as hayden and kidney beans) and peas (such as lentils). ? 2 cups of fresh, frozen, or canned fruit. A small apple or 1 banana or orange equals 1 cup. ? 3 cups of nonfat or low-fat milk, yogurt, or other milk products. ? 5½ ounces of meat and beans, such as chicken, fish, lean meat, beans, nuts, and seeds.  One egg, 1 tablespoon of peanut butter, ½ ounce nuts or seeds, or ¼ cup of cooked beans equals 1 ounce of meat.  Learn how to read food labels for serving sizes and ingredients. Fast-food and convenience-food meals often contain few or no fruits or vegetables. Make sure you eat some fruits and vegetables to make the meal more nutritious.  Look at your food diary. For each food group, add up what you have eaten and then divide the total by the number of days. This will give you an idea of how much you are eating from each food group. See if you can find some ways to change your diet to make it more healthy. Start small   Do not try to make dramatic changes to your diet all at once. You might feel that you are missing out on your favorite foods and then be more likely to fail.  Start slowly, and gradually change your habits. Try some of the following:  ? Use whole wheat bread instead of white bread. ? Use nonfat or low-fat milk instead of whole milk. ? Eat brown rice instead of white rice, and eat whole wheat pasta instead of white-flour pasta. ? Try low-fat cheeses and low-fat yogurt. ? Add more fruits and vegetables to meals and have them for snacks. ? Add lettuce, tomato, cucumber, and onion to sandwiches. ? Add fruit to yogurt and cereal.  Enjoy food   You can still eat your favorite foods. You just may need to eat less of them. If your favorite foods are high in fat, salt, and sugar, limit how often you eat them, but do not cut them out entirely.  Eat a wide variety of foods. Make healthy choices when eating out   The type of restaurant you choose can help you make healthy choices. Even fast-food chains are now offering more low-fat or healthier choices on the menu.  Choose smaller portions, or take half of your meal home.  When eating out, try:  ? A veggie pizza with a whole wheat crust or grilled chicken (instead of sausage or pepperoni).   ? Pasta with roasted vegetables, grilled chicken, or marinara sauce instead of cream sauce. ? A vegetable wrap or grilled chicken wrap. ? Broiled or poached food instead of fried or breaded items. Make healthy choices easy   Buy packaged, prewashed, ready-to-eat fresh vegetables and fruits, such as baby carrots, salad mixes, and chopped or shredded broccoli and cauliflower.  Buy packaged, presliced fruits, such as melon or pineapple.  Choose 100% fruit or vegetable juice instead of soda. Limit juice intake to 4 to 6 oz (½ to ¾ cup) a day.  Blend low-fat yogurt, fruit juice, and canned or frozen fruit to make a smoothie for breakfast or a snack. Where can you learn more? Go to https://OutsmartpeSitestar.AllFreed. org and sign in to your [employer] account. Enter T205 in the [facility] box to learn more about \"Eating Healthy Foods: Care Instructions. \"     If you do not have an account, please click on the \"Sign Up Now\" link. Current as of: September 8, 2021               Content Version: 13.2  © 4158-2613 [employer]wise, Incorporated. Care instructions adapted under license by [facility] ([facility]). If you have questions about a medical condition or this instruction, always ask your healthcare professional. [address] any warranty or liability for your use of this information.

## 2022-04-08 DIAGNOSIS — F41.9 ANXIETY: ICD-10-CM

## 2022-04-11 RX ORDER — ALPRAZOLAM 0.5 MG/1
TABLET ORAL
Qty: 30 TABLET | OUTPATIENT
Start: 2022-04-11

## 2022-04-12 ENCOUNTER — PATIENT MESSAGE (OUTPATIENT)
Dept: PRIMARY CARE CLINIC | Age: 47
End: 2022-04-12

## 2022-04-12 DIAGNOSIS — F41.9 ANXIETY: ICD-10-CM

## 2022-04-12 NOTE — TELEPHONE ENCOUNTER
From: Chema Aiken  To: Dr. Hemphill Axon: 4/12/2022 5:44 PM EDT  Subject: Prescription Refill    Hi Berhane Bolus, did you send in the request for Alprazolam .5mg to [facility] on 7[address]? I stopped by a few days ago and they didn't have a prescription for me. The pharmacist said she would send you a message and said I could do the same. Thank you!

## 2022-04-13 ENCOUNTER — TELEPHONE (OUTPATIENT)
Dept: PRIMARY CARE CLINIC | Age: 47
End: 2022-04-13

## 2022-04-13 RX ORDER — ALPRAZOLAM 0.5 MG/1
0.5 TABLET ORAL NIGHTLY PRN
Qty: 30 TABLET | Refills: 0 | Status: SHIPPED | OUTPATIENT
Start: 2022-04-13 | End: 2022-09-14 | Stop reason: SDUPTHER

## 2022-04-14 LAB — NONINV COLON CA DNA+OCC BLD SCRN STL QL: NEGATIVE

## 2022-04-26 DIAGNOSIS — Z00.00 PREVENTATIVE HEALTH CARE: ICD-10-CM

## 2022-04-26 LAB
A/G RATIO: 2.5 (ref 1.1–2.2)
ALBUMIN SERPL-MCNC: 4.9 G/DL (ref 3.4–5)
ALP BLD-CCNC: 111 U/L (ref 40–129)
ALT SERPL-CCNC: 16 U/L (ref 10–40)
ANION GAP SERPL CALCULATED.3IONS-SCNC: 13 MMOL/L (ref 3–16)
AST SERPL-CCNC: 19 U/L (ref 15–37)
BASOPHILS ABSOLUTE: 0 K/UL (ref 0–0.2)
BASOPHILS RELATIVE PERCENT: 0.7 %
BILIRUB SERPL-MCNC: 0.7 MG/DL (ref 0–1)
BUN BLDV-MCNC: 14 MG/DL (ref 7–20)
CALCIUM SERPL-MCNC: 9.2 MG/DL (ref 8.3–10.6)
CHLORIDE BLD-SCNC: 101 MMOL/L (ref 99–110)
CHOLESTEROL, TOTAL: 189 MG/DL (ref 0–199)
CO2: 24 MMOL/L (ref 21–32)
CREAT SERPL-MCNC: 0.8 MG/DL (ref 0.6–1.1)
EOSINOPHILS ABSOLUTE: 0.1 K/UL (ref 0–0.6)
EOSINOPHILS RELATIVE PERCENT: 2.4 %
GFR AFRICAN AMERICAN: >60
GFR NON-AFRICAN AMERICAN: >60
GLUCOSE BLD-MCNC: 106 MG/DL (ref 70–99)
HCT VFR BLD CALC: 40.7 % (ref 36–48)
HDLC SERPL-MCNC: 107 MG/DL (ref 40–60)
HEMOGLOBIN: 13.9 G/DL (ref 12–16)
LDL CHOLESTEROL CALCULATED: 71 MG/DL
LYMPHOCYTES ABSOLUTE: 1.3 K/UL (ref 1–5.1)
LYMPHOCYTES RELATIVE PERCENT: 29.2 %
MCH RBC QN AUTO: 31.5 PG (ref 26–34)
MCHC RBC AUTO-ENTMCNC: 34.1 G/DL (ref 31–36)
MCV RBC AUTO: 92.5 FL (ref 80–100)
MONOCYTES ABSOLUTE: 0.4 K/UL (ref 0–1.3)
MONOCYTES RELATIVE PERCENT: 9.9 %
NEUTROPHILS ABSOLUTE: 2.6 K/UL (ref 1.7–7.7)
NEUTROPHILS RELATIVE PERCENT: 57.8 %
PDW BLD-RTO: 12.4 % (ref 12.4–15.4)
PLATELET # BLD: 232 K/UL (ref 135–450)
PMV BLD AUTO: 9.2 FL (ref 5–10.5)
POTASSIUM SERPL-SCNC: 4.1 MMOL/L (ref 3.5–5.1)
RBC # BLD: 4.41 M/UL (ref 4–5.2)
SODIUM BLD-SCNC: 138 MMOL/L (ref 136–145)
TOTAL PROTEIN: 6.9 G/DL (ref 6.4–8.2)
TRIGL SERPL-MCNC: 57 MG/DL (ref 0–150)
TSH REFLEX FT4: 2.25 UIU/ML (ref 0.27–4.2)
VLDLC SERPL CALC-MCNC: 11 MG/DL
WBC # BLD: 4.5 K/UL (ref 4–11)

## 2022-04-27 LAB
ESTIMATED AVERAGE GLUCOSE: 102.5 MG/DL
HBA1C MFR BLD: 5.2 %

## 2022-06-14 ENCOUNTER — PATIENT MESSAGE (OUTPATIENT)
Dept: PRIMARY CARE CLINIC | Age: 47
End: 2022-06-14

## 2022-06-14 ENCOUNTER — OFFICE VISIT (OUTPATIENT)
Dept: PRIMARY CARE CLINIC | Age: 47
End: 2022-06-14
Payer: MEDICARE

## 2022-06-14 VITALS
RESPIRATION RATE: 16 BRPM | BODY MASS INDEX: 18.82 KG/M2 | HEIGHT: 66 IN | TEMPERATURE: 98.4 F | OXYGEN SATURATION: 96 % | WEIGHT: 117.13 LBS | SYSTOLIC BLOOD PRESSURE: 118 MMHG | HEART RATE: 87 BPM | DIASTOLIC BLOOD PRESSURE: 81 MMHG

## 2022-06-14 DIAGNOSIS — Z11.3 SCREEN FOR STD (SEXUALLY TRANSMITTED DISEASE): ICD-10-CM

## 2022-06-14 DIAGNOSIS — F41.1 GENERALIZED ANXIETY DISORDER: Primary | Chronic | ICD-10-CM

## 2022-06-14 LAB
ALBUMIN SERPL-MCNC: 4.3 G/DL (ref 3.4–5)
ALP BLD-CCNC: 88 U/L (ref 40–129)
ALT SERPL-CCNC: 12 U/L (ref 10–40)
AST SERPL-CCNC: 15 U/L (ref 15–37)
BILIRUB SERPL-MCNC: 0.3 MG/DL (ref 0–1)
BILIRUBIN DIRECT: <0.2 MG/DL (ref 0–0.3)
BILIRUBIN, INDIRECT: NORMAL MG/DL (ref 0–1)
TOTAL PROTEIN: 6.7 G/DL (ref 6.4–8.2)

## 2022-06-14 PROCEDURE — 99214 OFFICE O/P EST MOD 30 MIN: CPT | Performed by: FAMILY MEDICINE

## 2022-06-14 PROCEDURE — G8420 CALC BMI NORM PARAMETERS: HCPCS | Performed by: FAMILY MEDICINE

## 2022-06-14 PROCEDURE — G8427 DOCREV CUR MEDS BY ELIG CLIN: HCPCS | Performed by: FAMILY MEDICINE

## 2022-06-14 PROCEDURE — 1036F TOBACCO NON-USER: CPT | Performed by: FAMILY MEDICINE

## 2022-06-14 ASSESSMENT — ENCOUNTER SYMPTOMS
SHORTNESS OF BREATH: 0
DIARRHEA: 0
ABDOMINAL PAIN: 0
RHINORRHEA: 0
SORE THROAT: 0
CHEST TIGHTNESS: 0
CONSTIPATION: 0

## 2022-06-14 NOTE — PROGRESS NOTES
Subjective:   Patient ID: Theresa Nieves is a 56 y.o. female. HPI by clinical support staff:   Chief Complaint   Patient presents with    Referral - General     bloodwork for STD      Preliminary data above this line collected by clinical support staff.    ______________________________________________________________________  HPI by Provider:   HPI    recently got in a new relationship- would like to get tested for STDs. Back pain has improved since introducing protein to her diet. Depression \"much better\" on Cymbalta now. No new concerns. Data above this line collected by Provider. Patient's medications, allergies, past medical, surgical, social and family histories were reviewed and updated as appropriate. Patient Care Team:  Arcenio Calhoun MD as PCP - General (Family Medicine)  Arcenio Calhoun MD as PCP - [facility] Empaneled Provider  Current Outpatient Medications on File Prior to Visit   Medication Sig Dispense Refill    DULoxetine (CYMBALTA) 30 MG extended release capsule Take 1 capsule by mouth daily 90 capsule 2    Levonorgestrel (MIRENA, 52 MG, IU) by Intrauterine route       No current facility-administered medications on file prior to visit. Review of Systems   Constitutional: Negative for activity change, appetite change, fatigue and fever. HENT: Negative for congestion, rhinorrhea and sore throat. Respiratory: Negative for chest tightness and shortness of breath. Cardiovascular: Negative for chest pain, palpitations and leg swelling. Gastrointestinal: Negative for abdominal pain, constipation and diarrhea. Genitourinary: Negative for dysuria and frequency. Musculoskeletal: Negative for arthralgias. Neurological: Negative for dizziness, weakness and headaches. Psychiatric/Behavioral: Negative for hallucinations. All other systems reviewed and are negative. ROS above this line reviewed by Provider.       Objective:   /81 (Site: Left Upper Arm, Position: Sitting, Cuff Size: Medium Adult)   Pulse 87   Temp 98.4 °F (36.9 °C) (Temporal)   Resp 16   Ht 5' 5.5\" (1.664 m)   Wt 117 lb 2 oz (53.1 kg)   LMP 06/06/2022   SpO2 96%   BMI 19.19 kg/m²   Physical Exam  Vitals and nursing note reviewed. Constitutional:       General: She is not in acute distress. Appearance: Normal appearance. She is normal weight. She is not ill-appearing, toxic-appearing or diaphoretic. HENT:      Head: Normocephalic and atraumatic. Eyes:      General: No scleral icterus. Conjunctiva/sclera: Conjunctivae normal.   Cardiovascular:      Rate and Rhythm: Normal rate and regular rhythm. Heart sounds: Normal heart sounds. No murmur heard. No friction rub. No gallop. Pulmonary:      Effort: Pulmonary effort is normal. No respiratory distress. Breath sounds: Normal breath sounds. No stridor. No wheezing, rhonchi or rales. Musculoskeletal:      Cervical back: Normal range of motion. Skin:     General: Skin is warm and dry. Neurological:      Mental Status: She is alert. Psychiatric:         Mood and Affect: Mood normal.         Behavior: Behavior normal.       Assessment and Plan:   1. Generalized anxiety disorder  Improved- continue current regimen. 2. Screen for STD (sexually transmitted disease)  - HIV Screen; Future  - Herpes simplex virus (HSV) I/II antibodies IgG & IgM w/ reflex; Future  - Syphilis Antibody Cascading Reflex; Future  - Hepatic Function Panel; Future  - C.trachomatis N.gonorrhoeae DNA, Urine; Future           This chart note was prepared using a voice recognition dictation program. This note was reviewed for accuracy; however, addition, deletion and sound-alike word errors may occur. If there are any questions regarding this chart note, please contact the originating provider. Electronically signed by   Ignacio Jose MD  6/14/2022   10:58 AM    No follow-ups on file.

## 2022-06-15 LAB
HIV AG/AB: NORMAL
HIV ANTIGEN: NORMAL
HIV-1 ANTIBODY: NORMAL
HIV-2 AB: NORMAL
TOTAL SYPHILLIS IGG/IGM: NORMAL

## 2022-06-17 LAB
HERPES TYPE 1/2 IGM COMBINED: 0.36 IV
HERPES TYPE I/II IGG COMBINED: 0.92 IV

## 2022-08-10 ENCOUNTER — PATIENT MESSAGE (OUTPATIENT)
Dept: PRIMARY CARE CLINIC | Age: 47
End: 2022-08-10

## 2022-08-10 DIAGNOSIS — D22.9 ATYPICAL MOLE: Primary | ICD-10-CM

## 2022-08-11 NOTE — TELEPHONE ENCOUNTER
From: Naye Martínez  Sent: 8/10/2022 6:19 PM EDT  To: [facility] [id number] [address] Clinical Staff  Subject: Referral    I don't have a skin problem. I just try to get a full skin check because I have freckles and moles. I do this annually to have a dermatologist look for anything suspicious.

## 2022-09-02 ENCOUNTER — OFFICE VISIT (OUTPATIENT)
Dept: DERMATOLOGY | Age: 47
End: 2022-09-02
Payer: MEDICARE

## 2022-09-02 DIAGNOSIS — D48.5 NEOPLASM OF UNCERTAIN BEHAVIOR OF SKIN: Primary | ICD-10-CM

## 2022-09-02 PROCEDURE — 11102 TANGNTL BX SKIN SINGLE LES: CPT | Performed by: INTERNAL MEDICINE

## 2022-09-02 NOTE — PATIENT INSTRUCTIONS
Thank you for visiting [address] [facility] today! Please follow the instructions below as we discussed in clinic:      I will call you with biopsy results in 7-10 days    Biopsy Wound Care Instructions  Cleanse the wound with mild soapy water daily. Gently dry the area. Apply Vaseline or petroleum jelly to the wound using a cotton tipped applicator. Cover with a clean bandage. Repeat this process until the biopsy site is healed. You may shower and bathe as usual.   ** Biopsy results generally take around 7 business days to come back. If you have not heard from us by then, please call the office at [phone] between 8AM and 4PM Monday through Friday.

## 2022-09-02 NOTE — PROGRESS NOTES
[facility]  Cecilio Donaldson MD  [phone]    Date of Visit: 9/2/2022    Ann Ruelas is a 56 y.o. female who presents for skin lesion. New pt    Chief Complaint:   Chief Complaint   Patient presents with    Mole     Rt side of neck         History of Present Illness:    Concern: Mole on R neck  Location: R neck  Duration:  Several years, but more aware if it recently  Symptoms: Irritated, uncomfortable  Previous treatments:  None    *Derm hx:  -Saw prior derm regularly for FBSE. Had many moles removed that were benign      *Personal history of skin cancer: None  *Family history of skin cancer: None     Review of Systems:  Gen: Feels well, good sense of health. Skin: No new or changing moles, no history of keloids or hypertrophic scars. Past Medical History, Family History, Surgical History, Medications and Allergies reviewed. Past Medical History:   Diagnosis Date    Anxiety      Past Surgical History:   Procedure Laterality Date    BREAST ENHANCEMENT SURGERY Bilateral 2007    BREAST IMPLANT REMOVAL         No Known Allergies  Outpatient Medications Marked as Taking for the 9/2/22 encounter (Office Visit) with Jame Cogan, MD   Medication Sig Dispense Refill    DULoxetine (CYMBALTA) 30 MG extended release capsule Take 1 capsule by mouth daily 90 capsule 2    Levonorgestrel (MIRENA, 52 MG, IU) by Intrauterine route           Physical Examination   No acute distress. Mood clear/affect appropriate. Alert and oriented. Mucous membranes moist.  Sclera anicteric. Visible skin exam was conducted to include the scalp, face, lips/teeth, lids/conjunctiva, ears, neck, right and left hands and forearms and was normal with the following exceptions:   -Erythematous to skin colored papule on R neck      Assessment and Plan     1.  Neoplasm of uncertain behavior of skin, R neck  -Ddx includes irritated IDN vs. Other  *Shave biopsy procedure note:  -The procedure was discussed in detail. We also reviewed the risks of bleeding, scar, and infection. A consent form was signed by the patient.   -The lesion (s) to be removed on R neck was marked with a surgical pen. Alcohol was used to cleanse the site. Local anesthesia was acheived with 1% lidocaine with epinephrine. Shave removal of the lesion was performed down to mid dermis using a razor blade. Hemostasis was achieved with aluminum chloride. The wound was dressed with petrolatum and covered with a bandage. Wound care instructions were reviewed. Specimen (s) sent to pathology. The specimen bottle(s) were appropriately labeled.    -The patient tolerated the procedure well and there were no immediate complications. Return if symptoms worsen or fail to improve. Note is transcribed using voice recognition software. Inadvertent computerized transcription errors may be present.     Mick Sauceda MD

## 2022-09-07 LAB — DERMATOLOGY PATHOLOGY REPORT: NORMAL

## 2022-09-14 ENCOUNTER — PATIENT MESSAGE (OUTPATIENT)
Dept: PRIMARY CARE CLINIC | Age: 47
End: 2022-09-14

## 2022-09-14 DIAGNOSIS — F41.9 ANXIETY: ICD-10-CM

## 2022-09-14 RX ORDER — DULOXETIN HYDROCHLORIDE 30 MG/1
30 CAPSULE, DELAYED RELEASE ORAL DAILY
Qty: 90 CAPSULE | Refills: 2 | Status: SHIPPED | OUTPATIENT
Start: 2022-09-14

## 2022-09-14 RX ORDER — ALPRAZOLAM 0.5 MG/1
0.5 TABLET ORAL NIGHTLY PRN
Qty: 30 TABLET | Refills: 0 | Status: SHIPPED | OUTPATIENT
Start: 2022-09-14 | End: 2022-10-14

## 2022-09-14 NOTE — TELEPHONE ENCOUNTER
Medication:   Requested Prescriptions     Pending Prescriptions Disp Refills    DULoxetine (CYMBALTA) 30 MG extended release capsule 90 capsule 2     Sig: Take 1 capsule by mouth daily        Last Filled:      Patient Phone Number: [phone] (home)     Last appt: 6/14/2022   Next appt: Visit date not found    Last OARRS: No flowsheet data found.

## 2022-09-14 NOTE — TELEPHONE ENCOUNTER
From: Billy Bone  To: Dr. Herbert Poll: 9/14/2022 9:55 AM EDT  Subject: Prescriptions    Hello, it looks like there are no refills available for Duluxetine 30mg or for Alpraxolam .5mg and I am ready for refills for both. Alprazolam is prescribed for as needed only. I think my last prescription for it was 4/13/22. Let me know next steps. Thank you! Have a cipriano morning.

## 2022-09-14 NOTE — TELEPHONE ENCOUNTER
Medication:   Requested Prescriptions     Pending Prescriptions Disp Refills    DULoxetine (CYMBALTA) 30 MG extended release capsule 90 capsule 2     Sig: Take 1 capsule by mouth daily    ALPRAZolam (XANAX) 0.5 MG tablet 30 tablet 0     Sig: Take 1 tablet by mouth nightly as needed for Sleep for up to 30 days. Last Filled:      Patient Phone Number: [phone] (home)     Last appt: 6/14/2022   Next appt: Visit date not found    Last OARRS: No flowsheet data found.

## 2023-01-06 ENCOUNTER — TELEPHONE (OUTPATIENT)
Dept: PRIMARY CARE CLINIC | Age: 48
End: 2023-01-06

## 2023-01-24 ENCOUNTER — TELEPHONE (OUTPATIENT)
Dept: ORTHOPEDIC SURGERY | Age: 48
End: 2023-01-24

## 2023-01-24 NOTE — TELEPHONE ENCOUNTER
Returned call.  Lm on  offering VV on 2/6 at 8:30 am or 2/8 at 12:30 pm  Requested call back with date of choice

## 2023-01-24 NOTE — TELEPHONE ENCOUNTER
Appointment Request     Patient requesting earlier appointment: No  Appointment offered to patient: N/A  PATIENT REQ GAVIN DODD L HIP MRI which was completed March 22/AH  Patient Contact Number: [phone]

## 2023-02-01 ENCOUNTER — OFFICE VISIT (OUTPATIENT)
Dept: PRIMARY CARE CLINIC | Age: 48
End: 2023-02-01
Payer: MEDICARE

## 2023-02-01 VITALS
BODY MASS INDEX: 18.93 KG/M2 | DIASTOLIC BLOOD PRESSURE: 79 MMHG | WEIGHT: 117.8 LBS | TEMPERATURE: 97.2 F | HEART RATE: 90 BPM | OXYGEN SATURATION: 100 % | HEIGHT: 66 IN | SYSTOLIC BLOOD PRESSURE: 112 MMHG

## 2023-02-01 DIAGNOSIS — Z00.00 ENCOUNTER FOR WELL ADULT EXAM WITHOUT ABNORMAL FINDINGS: Primary | ICD-10-CM

## 2023-02-01 DIAGNOSIS — R53.82 CHRONIC FATIGUE: ICD-10-CM

## 2023-02-01 PROCEDURE — G8484 FLU IMMUNIZE NO ADMIN: HCPCS | Performed by: NURSE PRACTITIONER

## 2023-02-01 PROCEDURE — 99396 PREV VISIT EST AGE 40-64: CPT | Performed by: NURSE PRACTITIONER

## 2023-02-01 ASSESSMENT — PATIENT HEALTH QUESTIONNAIRE - PHQ9
5. POOR APPETITE OR OVEREATING: 0
9. THOUGHTS THAT YOU WOULD BE BETTER OFF DEAD, OR OF HURTING YOURSELF: 0
7. TROUBLE CONCENTRATING ON THINGS, SUCH AS READING THE NEWSPAPER OR WATCHING TELEVISION: 1
4. FEELING TIRED OR HAVING LITTLE ENERGY: 2
SUM OF ALL RESPONSES TO PHQ9 QUESTIONS 1 & 2: 0
2. FEELING DOWN, DEPRESSED OR HOPELESS: 0
SUM OF ALL RESPONSES TO PHQ QUESTIONS 1-9: 3
1. LITTLE INTEREST OR PLEASURE IN DOING THINGS: 0
SUM OF ALL RESPONSES TO PHQ QUESTIONS 1-9: 3
6. FEELING BAD ABOUT YOURSELF - OR THAT YOU ARE A FAILURE OR HAVE LET YOURSELF OR YOUR FAMILY DOWN: 0
8. MOVING OR SPEAKING SO SLOWLY THAT OTHER PEOPLE COULD HAVE NOTICED. OR THE OPPOSITE, BEING SO FIGETY OR RESTLESS THAT YOU HAVE BEEN MOVING AROUND A LOT MORE THAN USUAL: 0
10. IF YOU CHECKED OFF ANY PROBLEMS, HOW DIFFICULT HAVE THESE PROBLEMS MADE IT FOR YOU TO DO YOUR WORK, TAKE CARE OF THINGS AT HOME, OR GET ALONG WITH OTHER PEOPLE: 0
3. TROUBLE FALLING OR STAYING ASLEEP: 0

## 2023-02-01 ASSESSMENT — ENCOUNTER SYMPTOMS
RESPIRATORY NEGATIVE: 1
EYES NEGATIVE: 1
GASTROINTESTINAL NEGATIVE: 1

## 2023-02-01 NOTE — PROGRESS NOTES
Well Adult Note  Name: William Mayfield Date: 2023   MRN: [id number] Sex: Female   Age: 56 y.o. Ethnicity: Non- / Non    : 1975 Race: White (non-)      Wang Cote is here for well adult exam.  History:  Patient presents for a physical; patient complains of chronic fatigue , muscle pain and loss of concentration; ; she wants to get referral for further investigation and get labs. Review of Systems   Constitutional:  Positive for fatigue. HENT: Negative. Eyes: Negative. Respiratory: Negative. Cardiovascular: Negative. Gastrointestinal: Negative. Endocrine: Negative. Genitourinary: Negative. Musculoskeletal:  Positive for myalgias. Skin: Negative. Neurological: Negative. Hematological: Negative. Psychiatric/Behavioral:  Positive for decreased concentration. No Known Allergies      Prior to Visit Medications    Medication Sig Taking?  Authorizing Provider   DULoxetine (CYMBALTA) 30 MG extended release capsule Take 1 capsule by mouth daily Yes Sofia Robles MD   Levonorgestrel (MIRENA, 52 MG, IU) by Intrauterine route Yes Historical Provider, MD   naproxen (NAPROSYN) 500 MG tablet Take 1 tablet by mouth 2 times daily (with meals)  HENRI Peoples         Past Medical History:   Diagnosis Date    Anxiety        Past Surgical History:   Procedure Laterality Date    BREAST ENHANCEMENT SURGERY Bilateral 2007    BREAST IMPLANT REMOVAL           Family History   Problem Relation Age of Onset    High Blood Pressure Mother     No Known Problems Father     No Known Problems Sister     No Known Problems Brother     No Known Problems Brother     Other Maternal Grandmother         Suicide    Depression Maternal Grandmother         Situational    Parkinsonism Maternal Grandfather     No Known Problems Paternal Grandmother     Heart Disease Paternal Grandfather     Obesity Paternal Grandfather     Depression Daughter     No Known Problems Daughter     No Known Problems Son     Breast Cancer Maternal Aunt 39       Social History     Tobacco Use    Smoking status: Never    Smokeless tobacco: Never   Vaping Use    Vaping Use: Never used   Substance Use Topics    Alcohol use: Not Currently     Comment: Socially    Drug use: Never       Objective   /79 (Site: Left Upper Arm, Position: Sitting, Cuff Size: Medium Adult)   Pulse 90   Temp 97.2 °F (36.2 °C)   Ht 5' 6\" (1.676 m)   Wt 117 lb 12.8 oz (53.4 kg)   SpO2 100%   BMI 19.01 kg/m²   Wt Readings from Last 3 Encounters:   02/01/23 117 lb 12.8 oz (53.4 kg)   06/14/22 117 lb 2 oz (53.1 kg)   04/14/22 115 lb (52.2 kg)     There were no vitals filed for this visit. Physical Exam  HENT:      Right Ear: Tympanic membrane and ear canal normal.      Left Ear: Tympanic membrane and ear canal normal.      Nose: Nose normal.      Mouth/Throat:      Mouth: Mucous membranes are moist.   Eyes:      Extraocular Movements: Extraocular movements intact. Cardiovascular:      Rate and Rhythm: Normal rate. Pulses: Normal pulses. Heart sounds: Normal heart sounds. Pulmonary:      Effort: Pulmonary effort is normal.      Breath sounds: Normal breath sounds. Abdominal:      General: Bowel sounds are normal.      Palpations: Abdomen is soft. Musculoskeletal:         General: Normal range of motion. Cervical back: Normal range of motion. Skin:     General: Skin is warm. Neurological:      General: No focal deficit present. Mental Status: She is alert and oriented to person, place, and time. Psychiatric:         Mood and Affect: Mood normal.         Behavior: Behavior normal.         Thought Content: Thought content normal.         Judgment: Judgment normal.         Assessment   Plan   1. Encounter for well adult exam without abnormal findings  -     CBC with Auto Differential; Future  -     Comprehensive Metabolic Panel; Future  -     Lipid Panel; Future  2.  Chronic fatigue  - Dayana Copeland MD, Endocrinology, [facility]  -     CBC with Auto Differential; Future  -     Comprehensive Metabolic Panel; Future  -     TSH; Future  -     T4, Free; Future  -     Hemoglobin A1C; Future       Personalized Preventive Plan   Current Health Maintenance Status    There is no immunization history on file for this patient. Health Maintenance   Topic Date Due    COVID-19 Vaccine (1) Never done    DTaP/Tdap/Td vaccine (1 - Tdap) 02/01/2024 (Originally 4/21/1995)    Flu vaccine (1) 02/01/2024 (Originally 8/1/2022)    Cervical cancer screen  11/19/2023    Depression Monitoring  02/01/2024    Colorectal Cancer Screen  04/08/2025    Lipids  04/26/2027    Hepatitis A vaccine  Aged Out    Hib vaccine  Aged Out    Meningococcal (ACWY) vaccine  Aged Out    Pneumococcal 0-64 years Vaccine  Aged Out    Hepatitis C screen  Discontinued    HIV screen  Discontinued     Recommendations for Preventive Services Due: see orders and patient instructions/AVS.    No follow-ups on file.

## 2023-02-01 NOTE — PATIENT INSTRUCTIONS
Well Visit, Ages 25 to 72: Care Instructions  Well visits can help you stay healthy. Your doctor has checked your overall health and may have suggested ways to take good care of yourself. Your doctor also may have recommended tests. You can help prevent illness with healthy eating, good sleep, vaccinations, regular exercise, and other steps. Get the tests that you and your doctor decide on. Depending on your age and risks, examples might include screening for diabetes; hepatitis C; HIV; and cervical, breast, lung, and colon cancer. Screening helps find diseases before any symptoms appear. Eat healthy foods. Choose fruits, vegetables, whole grains, lean protein, and low-fat dairy foods. Limit saturated fat and reduce salt. Limit alcohol. Men should have no more than 2 drinks a day. Women should have no more than 1. For some people, no alcohol is the best choice. Exercise. Get at least 30 minutes of exercise on most days of the week. Walking can be a good choice. Reach and stay at your healthy weight. This will lower your risk for many health problems. Take care of your mental health. Try to stay connected with friends, family, and community, and find ways to manage stress. If you're feeling depressed or hopeless, talk to someone. A counselor can help. If you don't have a counselor, talk to your doctor. Talk to your doctor if you think you may have a problem with alcohol or drug use. This includes prescription medicines and illegal drugs. Avoid tobacco and nicotine: Don't smoke, vape, or chew. If you need help quitting, talk to your doctor. Practice safer sex. Getting tested, using condoms or dental dams, and limiting sex partners can help prevent STIs. Use birth control if it's important to you to prevent pregnancy. Talk with your doctor about your choices and what might be best for you. Prevent problems where you can.  Protect your skin from too much sun, wash your hands, brush your teeth twice a day, and wear a seat belt in the car. Where can you learn more? Go to http://www.jurado.com/ and enter P072 to learn more about \"Well Visit, Ages 25 to 72: Care Instructions. \"  Current as of: March 9, 2022               Content Version: 13.5  © 5251-0470 [employer]. Care instructions adapted under license by [facility] ([facility]). If you have questions about a medical condition or this instruction, always ask your healthcare professional. [address] any warranty or liability for your use of this information.

## 2023-02-06 ENCOUNTER — TELEPHONE (OUTPATIENT)
Dept: ORTHOPEDIC SURGERY | Age: 48
End: 2023-02-06

## 2023-02-06 RX ORDER — NAPROXEN 500 MG/1
500 TABLET ORAL 2 TIMES DAILY WITH MEALS
Qty: 28 TABLET | Refills: 1 | Status: SHIPPED | OUTPATIENT
Start: 2023-02-06

## 2023-02-06 NOTE — TELEPHONE ENCOUNTER
Called patient to follow up from her MRI visit. Over the past year, her hip has continued to hurt, mostly in the anterior portion. She has constant pain worse with walking. She has tried physical therapy  as well as chiropractic care and massage therapy. MRI  LSP 3/23/22  CONCLUSION:   1. No focal disc herniation or foraminal narrowing to contribute to right radiculopathy. 2. Lower lumbar facet capsulitis. 3. At L5-S1 small posterior annular tear. 4. Straightening of the lumbar lordosis can be seen in muscle spasm. 5. Dedicated right hip has been dictated separately. MRI  Right hip 3/23/22  CONCLUSION:   1. Grade 1 strain tensor fasciae latae, iliopsoas musculature. No macrotear   2. Grade 1 strain of the rectus femoris myotendinous junction. No macrotear. 3. Mild common hamstring tendinosis. No macrotear. Assessment:  MRI confirmed diagnosis of hip strain and lumbar capsulitis. Plan    RX for Naproxen discussed  and sent to patients  preferred pharmacy via [employer]. She will continue with her home exercise program including Yoga and massage therapy. Should her symptoms continue, she will make an appointment  to correlate clinical  exam and further treatment  options.     Darlene Hart, [address]

## 2023-02-22 DIAGNOSIS — R53.82 CHRONIC FATIGUE: Primary | ICD-10-CM

## 2023-02-28 DIAGNOSIS — Z00.00 ENCOUNTER FOR WELL ADULT EXAM WITHOUT ABNORMAL FINDINGS: ICD-10-CM

## 2023-02-28 DIAGNOSIS — R53.82 CHRONIC FATIGUE: ICD-10-CM

## 2023-02-28 LAB
A/G RATIO: 1.8 (ref 1.1–2.2)
ALBUMIN SERPL-MCNC: 4.2 G/DL (ref 3.4–5)
ALP BLD-CCNC: 79 U/L (ref 40–129)
ALT SERPL-CCNC: 13 U/L (ref 10–40)
ANION GAP SERPL CALCULATED.3IONS-SCNC: 13 MMOL/L (ref 3–16)
AST SERPL-CCNC: 13 U/L (ref 15–37)
BANDED NEUTROPHILS RELATIVE PERCENT: 1 % (ref 0–7)
BASOPHILS ABSOLUTE: 0 K/UL (ref 0–0.2)
BASOPHILS RELATIVE PERCENT: 1 %
BILIRUB SERPL-MCNC: 0.4 MG/DL (ref 0–1)
BUN BLDV-MCNC: 23 MG/DL (ref 7–20)
CALCIUM SERPL-MCNC: 8.9 MG/DL (ref 8.3–10.6)
CHLORIDE BLD-SCNC: 102 MMOL/L (ref 99–110)
CHOLESTEROL, TOTAL: 173 MG/DL (ref 0–199)
CO2: 22 MMOL/L (ref 21–32)
CREAT SERPL-MCNC: 0.9 MG/DL (ref 0.6–1.1)
EOSINOPHILS ABSOLUTE: 0.1 K/UL (ref 0–0.6)
EOSINOPHILS RELATIVE PERCENT: 2 %
GFR SERPL CREATININE-BSD FRML MDRD: >60 ML/MIN/{1.73_M2}
GLUCOSE BLD-MCNC: 96 MG/DL (ref 70–99)
HCT VFR BLD CALC: 38.8 % (ref 36–48)
HDLC SERPL-MCNC: 73 MG/DL (ref 40–60)
HEMOGLOBIN: 13.2 G/DL (ref 12–16)
LDL CHOLESTEROL CALCULATED: 83 MG/DL
LYMPHOCYTES ABSOLUTE: 1.8 K/UL (ref 1–5.1)
LYMPHOCYTES RELATIVE PERCENT: 36 %
MCH RBC QN AUTO: 30.9 PG (ref 26–34)
MCHC RBC AUTO-ENTMCNC: 34 G/DL (ref 31–36)
MCV RBC AUTO: 90.8 FL (ref 80–100)
MONOCYTES ABSOLUTE: 0.1 K/UL (ref 0–1.3)
MONOCYTES RELATIVE PERCENT: 2 %
NEUTROPHILS ABSOLUTE: 2.9 K/UL (ref 1.7–7.7)
NEUTROPHILS RELATIVE PERCENT: 58 %
PDW BLD-RTO: 12.1 % (ref 12.4–15.4)
PLATELET # BLD: 192 K/UL (ref 135–450)
PMV BLD AUTO: 10.3 FL (ref 5–10.5)
POTASSIUM SERPL-SCNC: 4.1 MMOL/L (ref 3.5–5.1)
RBC # BLD: 4.28 M/UL (ref 4–5.2)
RBC # BLD: NORMAL 10*6/UL
SODIUM BLD-SCNC: 137 MMOL/L (ref 136–145)
T4 FREE: 0.9 NG/DL (ref 0.9–1.8)
TOTAL PROTEIN: 6.5 G/DL (ref 6.4–8.2)
TRIGL SERPL-MCNC: 83 MG/DL (ref 0–150)
TSH SERPL DL<=0.05 MIU/L-ACNC: 2.37 UIU/ML (ref 0.27–4.2)
VLDLC SERPL CALC-MCNC: 17 MG/DL
WBC # BLD: 4.9 K/UL (ref 4–11)

## 2023-03-01 LAB
ESTIMATED AVERAGE GLUCOSE: 102.5 MG/DL
HBA1C MFR BLD: 5.2 %

## 2023-03-02 LAB — LYME, EIA: 0.71 LIV (ref 0–1.2)

## 2023-03-03 ENCOUNTER — TELEPHONE (OUTPATIENT)
Dept: PRIMARY CARE CLINIC | Age: 48
End: 2023-03-03

## 2023-05-31 ENCOUNTER — TELEPHONE (OUTPATIENT)
Dept: PRIMARY CARE CLINIC | Age: 48
End: 2023-05-31

## 2023-05-31 NOTE — TELEPHONE ENCOUNTER
Patient is requesting to have a referral back dated to 1- to the [facility] at [address] [place] [address] [address]. [address]  fax [phone] and the phone is [phone]. Patient had been having lots of pain and wanted to find out if allergies could have been the factor. She was able to schedule testing for food, animal and environmental allergies.

## 2023-07-26 ENCOUNTER — TELEPHONE (OUTPATIENT)
Dept: PRIMARY CARE CLINIC | Age: 48
End: 2023-07-26

## 2023-07-26 DIAGNOSIS — Z11.3 SCREENING FOR STD (SEXUALLY TRANSMITTED DISEASE): ICD-10-CM

## 2023-07-26 DIAGNOSIS — Z11.3 SCREENING FOR STD (SEXUALLY TRANSMITTED DISEASE): Primary | ICD-10-CM

## 2023-07-26 NOTE — TELEPHONE ENCOUNTER
Pt would like to have blood work drawn to check for STD's    Please place order and call pt once order is in epic

## 2023-07-26 NOTE — TELEPHONE ENCOUNTER
Orders placed per Dr. Karissa Granado. Patient has been informed that she can go to any [address] [place] [address] Lab to get this done.   Close Encounter

## 2023-07-27 LAB
C TRACH DNA UR QL NAA+PROBE: NEGATIVE
HIV 1+2 AB+HIV1 P24 AG SERPL QL IA: NORMAL
HIV 2 AB SERPL QL IA: NORMAL
HIV1 AB SERPL QL IA: NORMAL
HIV1 P24 AG SERPL QL IA: NORMAL
N GONORRHOEA DNA UR QL NAA+PROBE: NEGATIVE
REAGIN+T PALLIDUM IGG+IGM SERPL-IMP: NORMAL

## 2023-07-29 LAB
HSV1+2 IGG SER IA-ACNC: 0.28 IV
HSV1+2 IGM SER IA-ACNC: 0.4 IV

## 2023-08-04 DIAGNOSIS — G89.29 CHRONIC BILATERAL LOW BACK PAIN WITH BILATERAL SCIATICA: Primary | ICD-10-CM

## 2023-08-04 DIAGNOSIS — M54.41 CHRONIC BILATERAL LOW BACK PAIN WITH BILATERAL SCIATICA: Primary | ICD-10-CM

## 2023-08-04 DIAGNOSIS — M54.42 CHRONIC BILATERAL LOW BACK PAIN WITH BILATERAL SCIATICA: Primary | ICD-10-CM

## 2023-10-10 NOTE — ADDENDUM NOTE
Addended by: Renae Duque on: 4/13/2022 03:35 PM     Modules accepted: Orders Universal Safety Interventions